# Patient Record
Sex: FEMALE | Race: WHITE | NOT HISPANIC OR LATINO | Employment: FULL TIME | URBAN - METROPOLITAN AREA
[De-identification: names, ages, dates, MRNs, and addresses within clinical notes are randomized per-mention and may not be internally consistent; named-entity substitution may affect disease eponyms.]

---

## 2018-02-07 ENCOUNTER — APPOINTMENT (OUTPATIENT)
Dept: WOMENS IMAGING | Facility: HOSPITAL | Age: 43
End: 2018-02-07

## 2018-02-07 PROCEDURE — 77067 SCR MAMMO BI INCL CAD: CPT | Performed by: RADIOLOGY

## 2018-03-05 ENCOUNTER — APPOINTMENT (OUTPATIENT)
Dept: PREADMISSION TESTING | Facility: HOSPITAL | Age: 43
End: 2018-03-05

## 2018-03-05 ENCOUNTER — APPOINTMENT (OUTPATIENT)
Dept: WOMENS IMAGING | Facility: HOSPITAL | Age: 43
End: 2018-03-05

## 2018-03-05 VITALS
HEART RATE: 89 BPM | BODY MASS INDEX: 39.34 KG/M2 | RESPIRATION RATE: 18 BRPM | TEMPERATURE: 99.4 F | SYSTOLIC BLOOD PRESSURE: 127 MMHG | DIASTOLIC BLOOD PRESSURE: 71 MMHG | OXYGEN SATURATION: 100 % | WEIGHT: 222 LBS | HEIGHT: 63 IN

## 2018-03-05 LAB
ANISOCYTOSIS BLD QL: NORMAL
BASOPHILS # BLD AUTO: 0.04 10*3/MM3 (ref 0–0.2)
BASOPHILS NFR BLD AUTO: 0.8 % (ref 0–1.5)
DEPRECATED RDW RBC AUTO: 53.9 FL (ref 37–54)
EOSINOPHIL # BLD AUTO: 0.05 10*3/MM3 (ref 0–0.7)
EOSINOPHIL NFR BLD AUTO: 1 % (ref 0.3–6.2)
ERYTHROCYTE [DISTWIDTH] IN BLOOD BY AUTOMATED COUNT: 20.7 % (ref 11.7–13)
HCG SERPL QL: NEGATIVE
HCT VFR BLD AUTO: 25.5 % (ref 35.6–45.5)
HGB BLD-MCNC: 7.1 G/DL (ref 11.9–15.5)
HYPOCHROMIA BLD QL: NORMAL
IMM GRANULOCYTES # BLD: 0 10*3/MM3 (ref 0–0.03)
IMM GRANULOCYTES NFR BLD: 0 % (ref 0–0.5)
LYMPHOCYTES # BLD AUTO: 1.69 10*3/MM3 (ref 0.9–4.8)
LYMPHOCYTES NFR BLD AUTO: 33.5 % (ref 19.6–45.3)
MCH RBC QN AUTO: 20.1 PG (ref 26.9–32)
MCHC RBC AUTO-ENTMCNC: 27.8 G/DL (ref 32.4–36.3)
MCV RBC AUTO: 72.2 FL (ref 80.5–98.2)
MICROCYTES BLD QL: NORMAL
MONOCYTES # BLD AUTO: 0.27 10*3/MM3 (ref 0.2–1.2)
MONOCYTES NFR BLD AUTO: 5.4 % (ref 5–12)
NEUTROPHILS # BLD AUTO: 2.99 10*3/MM3 (ref 1.9–8.1)
NEUTROPHILS NFR BLD AUTO: 59.3 % (ref 42.7–76)
NRBC BLD MANUAL-RTO: 0 /100 WBC (ref 0–0)
PLAT MORPH BLD: NORMAL
PLATELET # BLD AUTO: 306 10*3/MM3 (ref 140–500)
PMV BLD AUTO: 9.2 FL (ref 6–12)
RBC # BLD AUTO: 3.53 10*6/MM3 (ref 3.9–5.2)
WBC MORPH BLD: NORMAL
WBC NRBC COR # BLD: 5.04 10*3/MM3 (ref 4.5–10.7)

## 2018-03-05 PROCEDURE — 85007 BL SMEAR W/DIFF WBC COUNT: CPT | Performed by: OBSTETRICS & GYNECOLOGY

## 2018-03-05 PROCEDURE — 77061 BREAST TOMOSYNTHESIS UNI: CPT | Performed by: RADIOLOGY

## 2018-03-05 PROCEDURE — 93010 ELECTROCARDIOGRAM REPORT: CPT | Performed by: INTERNAL MEDICINE

## 2018-03-05 PROCEDURE — 77065 DX MAMMO INCL CAD UNI: CPT | Performed by: RADIOLOGY

## 2018-03-05 PROCEDURE — G0279 TOMOSYNTHESIS, MAMMO: HCPCS | Performed by: RADIOLOGY

## 2018-03-05 PROCEDURE — 85025 COMPLETE CBC W/AUTO DIFF WBC: CPT | Performed by: OBSTETRICS & GYNECOLOGY

## 2018-03-05 PROCEDURE — 36415 COLL VENOUS BLD VENIPUNCTURE: CPT

## 2018-03-05 PROCEDURE — 93005 ELECTROCARDIOGRAM TRACING: CPT

## 2018-03-05 PROCEDURE — 84703 CHORIONIC GONADOTROPIN ASSAY: CPT | Performed by: OBSTETRICS & GYNECOLOGY

## 2018-03-05 PROCEDURE — 76641 ULTRASOUND BREAST COMPLETE: CPT | Performed by: RADIOLOGY

## 2018-03-05 RX ORDER — FERROUS SULFATE 325(65) MG
325 TABLET ORAL
COMMUNITY
End: 2018-11-14

## 2018-03-05 NOTE — DISCHARGE INSTRUCTIONS
Take the following medications the morning of surgery with a small sip of water:  NONE  ARRIVE AT 8AM    General Instructions:  • Do not eat solid food after midnight the night before surgery.  • You may drink clear liquids day of surgery but must stop at least one hour before your hospital arrival time.  • It is beneficial for you to have a clear drink that contains carbohydrates the day of surgery.  We suggest a 12 to 20 ounce bottle of Gatorade or Powerade for non-diabetic patients or a 12 to 20 ounce bottle of G2 or Powerade Zero for diabetic patients. (Pediatric patients, are not advised to drink a 12 to 20 ounce carbohydrate drink)    Clear liquids are liquids you can see through.  Nothing red in color.     Plain water                               Sports drinks  Sodas                                   Gelatin (Jell-O)  Fruit juices without pulp such as white grape juice and apple juice  Popsicles that contain no fruit or yogurt  Tea or coffee (no cream or milk added)  Gatorade / Powerade  G2 / Powerade Zero    • Infants may have breast milk up to four hours before surgery.  • Infants drinking formula may drink formula up to six hours before surgery.   • Patients who avoid smoking, chewing tobacco and alcohol for 4 weeks prior to surgery have a reduced risk of post-operative complications.  Quit smoking as many days before surgery as you can.  • Do not smoke, use chewing tobacco or drink alcohol the day of surgery.   • If applicable bring your C-PAP/ BI-PAP machine.  • Bring any papers given to you in the doctor’s office.  • Wear clean comfortable clothes and socks.  • Do not wear contact lenses or make-up.  Bring a case for your glasses.   • Bring crutches or walker if applicable.  • Remove all piercings.  Leave jewelry and any other valuables at home.  • Hair extensions with metal clips must be removed prior to surgery.  • The Pre-Admission Testing nurse will instruct you to bring medications if unable to  obtain an accurate list in Pre-Admission Testing.      Preventing a Surgical Site Infection:  • For 2 to 3 days before surgery, avoid shaving with a razor because the razor can irritate skin and make it easier to develop an infection.  • The night prior to surgery sleep in a clean bed with clean clothing.  Do not allow pets to sleep with you.  • Shower on the morning of surgery using a fresh bar of anti-bacterial soap (such as Dial) and clean washcloth.  Dry with a clean towel and dress in clean clothing.  • Ask your surgeon if you will be receiving antibiotics prior to surgery.  • Make sure you, your family, and all healthcare providers clean their hands with soap and water or an alcohol based hand  before caring for you or your wound.    Day of surgery:  Upon arrival, a Pre-op nurse and Anesthesiologist will review your health history, obtain vital signs, and answer questions you may have.  The only belongings needed at this time will be your home medications and if applicable your C-PAP/BI-PAP machine.  If you are staying overnight your family can leave the rest of your belongings in the car and bring them to your room later.  A Pre-op nurse will start an IV and you may receive medication in preparation for surgery, including something to help you relax.  Your family will be able to see you in the Pre-op area.  While you are in surgery your family should notify the waiting room  if they leave the waiting room area and provide a contact phone number.    Please be aware that surgery does come with discomfort.  We want to make every effort to control your discomfort so please discuss any uncontrolled symptoms with your nurse.   Your doctor will most likely have prescribed pain medications.      If you are going home after surgery you will receive individualized written care instructions before being discharged.  A responsible adult must drive you to and from the hospital on the day of your  surgery and stay with you for 24 hours.    If you are staying overnight following surgery, you will be transported to your hospital room following the recovery period.  [facility] has all private rooms.    If you have any questions please call Pre-Admission Testing at [phone].  Deductibles and co-payments are collected on the day of service. Please be prepared to pay the required co-pay, deductible or deposit on the day of service as defined by your plan.

## 2018-03-12 ENCOUNTER — ANESTHESIA (OUTPATIENT)
Dept: PERIOP | Facility: HOSPITAL | Age: 43
End: 2018-03-12

## 2018-03-12 ENCOUNTER — ANESTHESIA EVENT (OUTPATIENT)
Dept: PERIOP | Facility: HOSPITAL | Age: 43
End: 2018-03-12

## 2018-03-12 ENCOUNTER — HOSPITAL ENCOUNTER (OUTPATIENT)
Facility: HOSPITAL | Age: 43
Setting detail: HOSPITAL OUTPATIENT SURGERY
Discharge: HOME | End: 2018-03-12
Attending: OBSTETRICS & GYNECOLOGY | Admitting: OBSTETRICS & GYNECOLOGY

## 2018-03-12 VITALS
HEART RATE: 86 BPM | HEIGHT: 64 IN | RESPIRATION RATE: 16 BRPM | WEIGHT: 226.63 LBS | SYSTOLIC BLOOD PRESSURE: 128 MMHG | DIASTOLIC BLOOD PRESSURE: 76 MMHG | TEMPERATURE: 98.4 F | BODY MASS INDEX: 38.69 KG/M2 | OXYGEN SATURATION: 97 %

## 2018-03-12 DIAGNOSIS — D64.9 ANEMIA: ICD-10-CM

## 2018-03-12 DIAGNOSIS — N94.6 MENORRHALGIA: ICD-10-CM

## 2018-03-12 LAB
B-HCG UR QL: NEGATIVE
INTERNAL NEGATIVE CONTROL: NEGATIVE
INTERNAL POSITIVE CONTROL: POSITIVE
Lab: NORMAL

## 2018-03-12 PROCEDURE — 88305 TISSUE EXAM BY PATHOLOGIST: CPT | Performed by: OBSTETRICS & GYNECOLOGY

## 2018-03-12 PROCEDURE — 25010000002 DEXAMETHASONE PER 1 MG: Performed by: NURSE ANESTHETIST, CERTIFIED REGISTERED

## 2018-03-12 PROCEDURE — 25010000002 PROPOFOL 10 MG/ML EMULSION: Performed by: NURSE ANESTHETIST, CERTIFIED REGISTERED

## 2018-03-12 PROCEDURE — 25010000002 FENTANYL CITRATE (PF) 100 MCG/2ML SOLUTION: Performed by: NURSE ANESTHETIST, CERTIFIED REGISTERED

## 2018-03-12 PROCEDURE — 25010000002 ROPIVACAINE PER 1 MG: Performed by: OBSTETRICS & GYNECOLOGY

## 2018-03-12 PROCEDURE — 25010000002 FENTANYL CITRATE (PF) 100 MCG/2ML SOLUTION

## 2018-03-12 PROCEDURE — 25010000002 DIPHENHYDRAMINE PER 50 MG: Performed by: NURSE ANESTHETIST, CERTIFIED REGISTERED

## 2018-03-12 PROCEDURE — 25010000002 MIDAZOLAM PER 1 MG: Performed by: ANESTHESIOLOGY

## 2018-03-12 RX ORDER — OXYCODONE AND ACETAMINOPHEN 7.5; 325 MG/1; MG/1
1 TABLET ORAL ONCE AS NEEDED
Status: COMPLETED | OUTPATIENT
Start: 2018-03-12 | End: 2018-03-12

## 2018-03-12 RX ORDER — FAMOTIDINE 10 MG/ML
20 INJECTION, SOLUTION INTRAVENOUS ONCE
Status: COMPLETED | OUTPATIENT
Start: 2018-03-12 | End: 2018-03-12

## 2018-03-12 RX ORDER — CELECOXIB 200 MG/1
400 CAPSULE ORAL ONCE
Status: DISCONTINUED | OUTPATIENT
Start: 2018-03-12 | End: 2018-03-12 | Stop reason: HOSPADM

## 2018-03-12 RX ORDER — HYDRALAZINE HYDROCHLORIDE 20 MG/ML
5 INJECTION INTRAMUSCULAR; INTRAVENOUS
Status: DISCONTINUED | OUTPATIENT
Start: 2018-03-12 | End: 2018-03-12 | Stop reason: HOSPADM

## 2018-03-12 RX ORDER — HYDROCODONE BITARTRATE AND ACETAMINOPHEN 7.5; 325 MG/1; MG/1
1 TABLET ORAL ONCE AS NEEDED
Status: DISCONTINUED | OUTPATIENT
Start: 2018-03-12 | End: 2018-03-12 | Stop reason: HOSPADM

## 2018-03-12 RX ORDER — DIPHENHYDRAMINE HYDROCHLORIDE 50 MG/ML
12.5 INJECTION INTRAMUSCULAR; INTRAVENOUS
Status: DISCONTINUED | OUTPATIENT
Start: 2018-03-12 | End: 2018-03-12 | Stop reason: HOSPADM

## 2018-03-12 RX ORDER — DEXAMETHASONE SODIUM PHOSPHATE 10 MG/ML
INJECTION INTRAMUSCULAR; INTRAVENOUS AS NEEDED
Status: DISCONTINUED | OUTPATIENT
Start: 2018-03-12 | End: 2018-03-12 | Stop reason: SURG

## 2018-03-12 RX ORDER — OXYCODONE AND ACETAMINOPHEN 7.5; 325 MG/1; MG/1
TABLET ORAL
Status: COMPLETED
Start: 2018-03-12 | End: 2018-03-12

## 2018-03-12 RX ORDER — CELECOXIB 200 MG/1
400 CAPSULE ORAL ONCE
Status: COMPLETED | OUTPATIENT
Start: 2018-03-12 | End: 2018-03-12

## 2018-03-12 RX ORDER — HYDROMORPHONE HCL 110MG/55ML
0.5 PATIENT CONTROLLED ANALGESIA SYRINGE INTRAVENOUS
Status: DISCONTINUED | OUTPATIENT
Start: 2018-03-12 | End: 2018-03-12 | Stop reason: HOSPADM

## 2018-03-12 RX ORDER — SODIUM CHLORIDE 0.9 % (FLUSH) 0.9 %
1-10 SYRINGE (ML) INJECTION AS NEEDED
Status: DISCONTINUED | OUTPATIENT
Start: 2018-03-12 | End: 2018-03-12 | Stop reason: HOSPADM

## 2018-03-12 RX ORDER — PROPOFOL 10 MG/ML
VIAL (ML) INTRAVENOUS AS NEEDED
Status: DISCONTINUED | OUTPATIENT
Start: 2018-03-12 | End: 2018-03-12 | Stop reason: SURG

## 2018-03-12 RX ORDER — FLUMAZENIL 0.1 MG/ML
0.2 INJECTION INTRAVENOUS AS NEEDED
Status: DISCONTINUED | OUTPATIENT
Start: 2018-03-12 | End: 2018-03-12 | Stop reason: HOSPADM

## 2018-03-12 RX ORDER — MEPERIDINE HYDROCHLORIDE 25 MG/ML
12.5 INJECTION INTRAMUSCULAR; INTRAVENOUS; SUBCUTANEOUS
Status: DISCONTINUED | OUTPATIENT
Start: 2018-03-12 | End: 2018-03-12 | Stop reason: HOSPADM

## 2018-03-12 RX ORDER — LIDOCAINE HYDROCHLORIDE 10 MG/ML
0.5 INJECTION, SOLUTION EPIDURAL; INFILTRATION; INTRACAUDAL; PERINEURAL ONCE AS NEEDED
Status: DISCONTINUED | OUTPATIENT
Start: 2018-03-12 | End: 2018-03-12 | Stop reason: HOSPADM

## 2018-03-12 RX ORDER — PROMETHAZINE HYDROCHLORIDE 25 MG/ML
12.5 INJECTION, SOLUTION INTRAMUSCULAR; INTRAVENOUS ONCE AS NEEDED
Status: DISCONTINUED | OUTPATIENT
Start: 2018-03-12 | End: 2018-03-12 | Stop reason: HOSPADM

## 2018-03-12 RX ORDER — FENTANYL CITRATE 50 UG/ML
INJECTION, SOLUTION INTRAMUSCULAR; INTRAVENOUS AS NEEDED
Status: DISCONTINUED | OUTPATIENT
Start: 2018-03-12 | End: 2018-03-12 | Stop reason: SURG

## 2018-03-12 RX ORDER — EPHEDRINE SULFATE 50 MG/ML
5 INJECTION, SOLUTION INTRAVENOUS ONCE AS NEEDED
Status: DISCONTINUED | OUTPATIENT
Start: 2018-03-12 | End: 2018-03-12 | Stop reason: HOSPADM

## 2018-03-12 RX ORDER — MIDAZOLAM HYDROCHLORIDE 1 MG/ML
1 INJECTION INTRAMUSCULAR; INTRAVENOUS
Status: DISCONTINUED | OUTPATIENT
Start: 2018-03-12 | End: 2018-03-12 | Stop reason: HOSPADM

## 2018-03-12 RX ORDER — NALOXONE HCL 0.4 MG/ML
0.2 VIAL (ML) INJECTION AS NEEDED
Status: DISCONTINUED | OUTPATIENT
Start: 2018-03-12 | End: 2018-03-12 | Stop reason: HOSPADM

## 2018-03-12 RX ORDER — PROMETHAZINE HYDROCHLORIDE 25 MG/1
25 TABLET ORAL ONCE AS NEEDED
Status: DISCONTINUED | OUTPATIENT
Start: 2018-03-12 | End: 2018-03-12 | Stop reason: HOSPADM

## 2018-03-12 RX ORDER — OXYCODONE HCL 10 MG/1
20 TABLET, FILM COATED, EXTENDED RELEASE ORAL ONCE
Status: DISCONTINUED | OUTPATIENT
Start: 2018-03-12 | End: 2018-03-12 | Stop reason: HOSPADM

## 2018-03-12 RX ORDER — SODIUM CHLORIDE, SODIUM LACTATE, POTASSIUM CHLORIDE, CALCIUM CHLORIDE 600; 310; 30; 20 MG/100ML; MG/100ML; MG/100ML; MG/100ML
INJECTION, SOLUTION INTRAVENOUS CONTINUOUS PRN
Status: DISCONTINUED | OUTPATIENT
Start: 2018-03-12 | End: 2018-03-12 | Stop reason: SURG

## 2018-03-12 RX ORDER — FENTANYL CITRATE 50 UG/ML
INJECTION, SOLUTION INTRAMUSCULAR; INTRAVENOUS
Status: COMPLETED
Start: 2018-03-12 | End: 2018-03-12

## 2018-03-12 RX ORDER — ONDANSETRON 2 MG/ML
4 INJECTION INTRAMUSCULAR; INTRAVENOUS ONCE AS NEEDED
Status: DISCONTINUED | OUTPATIENT
Start: 2018-03-12 | End: 2018-03-12 | Stop reason: HOSPADM

## 2018-03-12 RX ORDER — LIDOCAINE HYDROCHLORIDE 20 MG/ML
INJECTION, SOLUTION INFILTRATION; PERINEURAL AS NEEDED
Status: DISCONTINUED | OUTPATIENT
Start: 2018-03-12 | End: 2018-03-12 | Stop reason: SURG

## 2018-03-12 RX ORDER — PROMETHAZINE HYDROCHLORIDE 25 MG/1
12.5 TABLET ORAL ONCE AS NEEDED
Status: DISCONTINUED | OUTPATIENT
Start: 2018-03-12 | End: 2018-03-12 | Stop reason: HOSPADM

## 2018-03-12 RX ORDER — PROMETHAZINE HYDROCHLORIDE 25 MG/1
25 SUPPOSITORY RECTAL ONCE AS NEEDED
Status: DISCONTINUED | OUTPATIENT
Start: 2018-03-12 | End: 2018-03-12 | Stop reason: HOSPADM

## 2018-03-12 RX ORDER — SODIUM CHLORIDE, SODIUM LACTATE, POTASSIUM CHLORIDE, CALCIUM CHLORIDE 600; 310; 30; 20 MG/100ML; MG/100ML; MG/100ML; MG/100ML
9 INJECTION, SOLUTION INTRAVENOUS CONTINUOUS
Status: DISCONTINUED | OUTPATIENT
Start: 2018-03-12 | End: 2018-03-12 | Stop reason: HOSPADM

## 2018-03-12 RX ORDER — OXYCODONE HCL 10 MG/1
20 TABLET, FILM COATED, EXTENDED RELEASE ORAL ONCE
Status: COMPLETED | OUTPATIENT
Start: 2018-03-12 | End: 2018-03-12

## 2018-03-12 RX ORDER — FENTANYL CITRATE 50 UG/ML
50 INJECTION, SOLUTION INTRAMUSCULAR; INTRAVENOUS
Status: DISCONTINUED | OUTPATIENT
Start: 2018-03-12 | End: 2018-03-12 | Stop reason: HOSPADM

## 2018-03-12 RX ORDER — CEFAZOLIN SODIUM 2 G/100ML
2 INJECTION, SOLUTION INTRAVENOUS ONCE
Status: DISCONTINUED | OUTPATIENT
Start: 2018-03-12 | End: 2018-03-12 | Stop reason: HOSPADM

## 2018-03-12 RX ORDER — MIDAZOLAM HYDROCHLORIDE 1 MG/ML
2 INJECTION INTRAMUSCULAR; INTRAVENOUS
Status: DISCONTINUED | OUTPATIENT
Start: 2018-03-12 | End: 2018-03-12 | Stop reason: HOSPADM

## 2018-03-12 RX ORDER — LABETALOL HYDROCHLORIDE 5 MG/ML
5 INJECTION, SOLUTION INTRAVENOUS
Status: DISCONTINUED | OUTPATIENT
Start: 2018-03-12 | End: 2018-03-12 | Stop reason: HOSPADM

## 2018-03-12 RX ADMIN — DEXAMETHASONE SODIUM PHOSPHATE 6 MG: 10 INJECTION INTRAMUSCULAR; INTRAVENOUS at 10:26

## 2018-03-12 RX ADMIN — SODIUM CHLORIDE, POTASSIUM CHLORIDE, SODIUM LACTATE AND CALCIUM CHLORIDE 9 ML/HR: 600; 310; 30; 20 INJECTION, SOLUTION INTRAVENOUS at 10:00

## 2018-03-12 RX ADMIN — FENTANYL CITRATE 50 MCG: 50 INJECTION, SOLUTION INTRAMUSCULAR; INTRAVENOUS at 11:08

## 2018-03-12 RX ADMIN — FENTANYL CITRATE 50 MCG: 50 INJECTION, SOLUTION INTRAMUSCULAR; INTRAVENOUS at 11:36

## 2018-03-12 RX ADMIN — SODIUM CHLORIDE, POTASSIUM CHLORIDE, SODIUM LACTATE AND CALCIUM CHLORIDE: 600; 310; 30; 20 INJECTION, SOLUTION INTRAVENOUS at 09:31

## 2018-03-12 RX ADMIN — MIDAZOLAM 2 MG: 1 INJECTION INTRAMUSCULAR; INTRAVENOUS at 10:00

## 2018-03-12 RX ADMIN — FAMOTIDINE 20 MG: 10 INJECTION INTRAVENOUS at 10:00

## 2018-03-12 RX ADMIN — CELECOXIB 400 MG: 200 CAPSULE ORAL at 09:24

## 2018-03-12 RX ADMIN — OXYCODONE HYDROCHLORIDE 20 MG: 10 TABLET, FILM COATED, EXTENDED RELEASE ORAL at 09:24

## 2018-03-12 RX ADMIN — PROPOFOL 200 MG: 10 INJECTION, EMULSION INTRAVENOUS at 10:15

## 2018-03-12 RX ADMIN — DIPHENHYDRAMINE HYDROCHLORIDE 12.5 MG: 50 INJECTION INTRAMUSCULAR; INTRAVENOUS at 13:40

## 2018-03-12 RX ADMIN — FENTANYL CITRATE 50 MCG: 50 INJECTION INTRAMUSCULAR; INTRAVENOUS at 10:14

## 2018-03-12 RX ADMIN — LIDOCAINE HYDROCHLORIDE 100 MG: 20 INJECTION, SOLUTION INFILTRATION; PERINEURAL at 10:15

## 2018-03-12 RX ADMIN — OXYCODONE AND ACETAMINOPHEN 1 TABLET: 7.5; 325 TABLET ORAL at 12:48

## 2018-03-12 RX ADMIN — OXYCODONE HYDROCHLORIDE AND ACETAMINOPHEN 1 TABLET: 7.5; 325 TABLET ORAL at 12:48

## 2018-03-12 NOTE — ANESTHESIA POSTPROCEDURE EVALUATION
"Patient: Jaskaran Mckay    Procedure Summary     Date:  03/12/18 Room / Location:  [facility] OR 06 / [facility] MAIN OR    Anesthesia Start:  1008 Anesthesia Stop:  1057    Procedure:  DILATATION AND CURETTAGE HYSTEROSCOPY NOVASURE (N/A Vagina) Diagnosis:      Surgeon:  Mirtha Martin MD Provider:  Cristhian Hummel MD    Anesthesia Type:  general ASA Status:  3          Anesthesia Type: general  Last vitals  BP   147/88 (03/12/18 1250)   Temp   36.9 °C (98.4 °F) (03/12/18 1235)   Pulse   95 (03/12/18 1250)   Resp   16 (03/12/18 1250)     SpO2   100 % (03/12/18 1250)     Post Anesthesia Care and Evaluation    Patient location during evaluation: bedside  Patient participation: complete - patient participated  Level of consciousness: awake and alert  Pain management: adequate  Airway patency: patent  Anesthetic complications: No anesthetic complications    Cardiovascular status: acceptable  Respiratory status: acceptable  Hydration status: acceptable    Comments: /88   Pulse 95   Temp 36.9 °C (98.4 °F) (Oral)   Resp 16   Ht 162.6 cm (64.02\")   Wt 103 kg (226 lb 10.1 oz)   LMP 03/11/2018   SpO2 100%   BMI 38.88 kg/m²       "

## 2018-03-12 NOTE — OP NOTE
Subjective     Patient Name: Jaskaran Mckay  :  1975  MRN:  [id number]      Date of Service:  18      Surgeon: Mirtha Martin MD       Pre-operative diagnosis(es): Menorrhagia  Severe anemia   Post-operative diagnosis(es): Post-Op Diagnosis Codes:   same   Procedure(s): Procedure(s):  DILATATION AND CURETTAGE HYSTEROSCOPY NOVASURE           Anesthesia: Type: General - LMA       Objective      Operative findings: Blood in vagina/perineum as on menses. Slow trickle of dark blood. Mid normal uterus/ normal cervix ..... Hysteroscopy - normal menstrual endometrium, normal tubal ostia.... novasure- width 4.7cm, length 5.5cm. Power 142. Uninterrupted ablation time 1min 15 sec.  Normal post ablation endometrium     Specimens removed:   Order Name Source Comment Collection Info Order Time   TISSUE PATHOLOGY EXAM Endometrial Curettings  Collected By: Mirtha Martin MD 3/12/2018 10:45 AM          Complications:none    EBL: none   Hysteroscopic fluids- saline matched in=out      Details- DESCRIPTION OF PROCEDURE: She was taken to the operating room. She was placed under general LMA anesthesia. She was prepped and draped and her bladder was drained. Speculum was placed. A paracervical block was placed using a total of 30 mL of mixture of 1% lidocaine and 0.5% Naropin. Tenaculum was attached to the cervix. She sounded to 10 cm. The hysteroscope was advanced in endometrial cavity. She had a normal menstrual endometrium, as she was on her period. Measurements of the cervix were taken, as well, and the cavity length was calculated. A sharp curettage was performed with a #1 curette until a gritty texture was noted in all areas of the uterus. She had a relatively large amount of tissue, as she was just at the very beginning of her period. Another look with the hysteroscope revealed a normal post-curetting endometrium. NovaSure device was opened and was placed in the cavity and measurements were obtained and entered.  We passed the cavity assessment test without difficulty and there was an uninterrupted 1-minute 15-second ablation time that was completed. The NovaSure device was then removed from the uterus. Another look with the hysteroscope revealed a normal post-ablation endometrium. Instruments were removed. She was taken to recovery in good condition.                                                    Mirtha Martin MD  03/12/18  10:57 AM

## 2018-03-12 NOTE — ANESTHESIA PROCEDURE NOTES
Airway  Urgency: elective    Date/Time: 3/12/2018 10:17 AM  Airway not difficult    General Information and Staff    Patient location during procedure: OR  Anesthesiologist: JACKI HARMON  CRNA: AMBER GONZALEZ    Indications and Patient Condition  Indications for airway management: airway protection    Preoxygenated: yes  MILS not maintained throughout  Mask difficulty assessment: 1 - vent by mask    Final Airway Details  Final airway type: supraglottic airway      Successful airway: classic  Size 4    Number of attempts at approach: 1    Additional Comments  Pre O2, SIAI

## 2018-03-12 NOTE — ANESTHESIA PREPROCEDURE EVALUATION
Anesthesia Evaluation     Patient summary reviewed and Nursing notes reviewed   NPO Solid Status: > 8 hours  NPO Liquid Status: > 4 hours           Airway   Mallampati: II  TM distance: >3 FB  Neck ROM: full  no difficulty expected  Dental - normal exam     Pulmonary - negative pulmonary ROS and normal exam   Cardiovascular - negative cardio ROS and normal exam        Neuro/Psych- negative ROS  GI/Hepatic/Renal/Endo    (+) obesity, morbid obesity,      Musculoskeletal (-) negative ROS    Abdominal  - normal exam   Substance History - negative use     OB/GYN          Other - negative ROS                       Anesthesia Plan    ASA 3     general     intravenous induction   Anesthetic plan and risks discussed with patient.

## 2018-03-13 LAB
CYTO UR: NORMAL
LAB AP CASE REPORT: NORMAL
Lab: NORMAL
PATH REPORT.FINAL DX SPEC: NORMAL
PATH REPORT.GROSS SPEC: NORMAL

## 2018-04-27 ENCOUNTER — CONSULT (OUTPATIENT)
Dept: ONCOLOGY | Facility: CLINIC | Age: 43
End: 2018-04-27

## 2018-04-27 ENCOUNTER — LAB (OUTPATIENT)
Dept: LAB | Facility: HOSPITAL | Age: 43
End: 2018-04-27

## 2018-04-27 VITALS
TEMPERATURE: 97.9 F | DIASTOLIC BLOOD PRESSURE: 74 MMHG | BODY MASS INDEX: 38.1 KG/M2 | HEIGHT: 64 IN | SYSTOLIC BLOOD PRESSURE: 116 MMHG | WEIGHT: 223.2 LBS | RESPIRATION RATE: 12 BRPM | OXYGEN SATURATION: 99 % | HEART RATE: 87 BPM

## 2018-04-27 DIAGNOSIS — N92.0 MENORRHAGIA WITH REGULAR CYCLE: ICD-10-CM

## 2018-04-27 DIAGNOSIS — E55.9 VITAMIN D DEFICIENCY: ICD-10-CM

## 2018-04-27 DIAGNOSIS — D64.9 ANEMIA, UNSPECIFIED TYPE: Primary | ICD-10-CM

## 2018-04-27 DIAGNOSIS — D50.0 IRON DEFICIENCY ANEMIA DUE TO CHRONIC BLOOD LOSS: Primary | ICD-10-CM

## 2018-04-27 LAB
BASOPHILS # BLD AUTO: 0.04 10*3/MM3 (ref 0–0.1)
BASOPHILS NFR BLD AUTO: 0.9 % (ref 0–1.1)
DEPRECATED RDW RBC AUTO: 50.9 FL (ref 37–49)
EOSINOPHIL # BLD AUTO: 0.23 10*3/MM3 (ref 0–0.36)
EOSINOPHIL NFR BLD AUTO: 5.3 % (ref 1–5)
ERYTHROCYTE [DISTWIDTH] IN BLOOD BY AUTOMATED COUNT: 20 % (ref 11.7–14.5)
HCT VFR BLD AUTO: 25.4 % (ref 34–45)
HGB BLD-MCNC: 7.2 G/DL (ref 11.5–14.9)
IMM GRANULOCYTES # BLD: 0.02 10*3/MM3 (ref 0–0.03)
IMM GRANULOCYTES NFR BLD: 0.5 % (ref 0–0.5)
LYMPHOCYTES # BLD AUTO: 1.6 10*3/MM3 (ref 1–3.5)
LYMPHOCYTES NFR BLD AUTO: 37 % (ref 20–49)
MCH RBC QN AUTO: 19.9 PG (ref 27–33)
MCHC RBC AUTO-ENTMCNC: 28.3 G/DL (ref 32–35)
MCV RBC AUTO: 70.2 FL (ref 83–97)
MONOCYTES # BLD AUTO: 0.35 10*3/MM3 (ref 0.25–0.8)
MONOCYTES NFR BLD AUTO: 8.1 % (ref 4–12)
NEUTROPHILS # BLD AUTO: 2.09 10*3/MM3 (ref 1.5–7)
NEUTROPHILS NFR BLD AUTO: 48.2 % (ref 39–75)
NRBC BLD MANUAL-RTO: 0 /100 WBC (ref 0–0)
PLATELET # BLD AUTO: 374 10*3/MM3 (ref 150–375)
PMV BLD AUTO: 9.3 FL (ref 8.9–12.1)
RBC # BLD AUTO: 3.62 10*6/MM3 (ref 3.9–5)
WBC NRBC COR # BLD: 4.33 10*3/MM3 (ref 4–10)

## 2018-04-27 PROCEDURE — 99244 OFF/OP CNSLTJ NEW/EST MOD 40: CPT | Performed by: INTERNAL MEDICINE

## 2018-04-27 PROCEDURE — 85025 COMPLETE CBC W/AUTO DIFF WBC: CPT | Performed by: INTERNAL MEDICINE

## 2018-04-27 PROCEDURE — 36415 COLL VENOUS BLD VENIPUNCTURE: CPT | Performed by: INTERNAL MEDICINE

## 2018-04-27 RX ORDER — NAPROXEN 500 MG/1
TABLET ORAL
Refills: 0 | COMMUNITY
Start: 2018-03-19 | End: 2018-11-14

## 2018-04-27 NOTE — PROGRESS NOTES
REFERRING PROVIDER:    Mirtha Martin MD  [address] [facility] [address]  [place] [address]    REASON FOR CONSULTATION:    1.  Iron deficiency anemia secondary to menorrhagia    Old records reviewed.  Old lab data reviewed.      HISTORY OF PRESENT ILLNESS:  Jaskaran Mckay is a 42 y.o. female who is referred today for further evaluation of iron deficiency anemia secondary to menorrhagia.    She denies any significant bleeding problems prior to the past several years.  She had her wisdom teeth out in the past without excessive bleeding.  She denies any menorrhagia around the time of menarche.  Over the past several years, however, her menstrual periods have become extremely heavy.  The last for about 7 days.  At the beginning of her periods she is changing 2 pads every hour or so.  She had an endometrial ablation performed on 3/12/2018 and had a typical menstrual.  For her in early April.  They are waiting to see how her.  In May is before deciding on a hysterectomy but she has decided that she does want this done.  She has required packed red blood cells in the past.  She has been on oral iron, ferrous sulfate, twice daily for years.  She tolerates this well.  Her last transfusion was in September 2017.   Iron studies done at [facility] in September 2017 showed an iron of 37% iron saturation and a ferritin of 5.8.  Recent labs on 3/5/2018 showed a hemoglobin of 7.1 with hematocrit 25.5% and MCV of 72.2.  She has never had intravenous iron.  She has not tried tranexamic acid for the menorrhagia.    She craves ice.  She reports chronic weakness and dyspnea on exertion.  She denies any other bleeding.  She had an upper and lower endoscopy performed last year that did not indicate any bleeding source.    Past Medical History:   Diagnosis Date   • Abnormal Pap smear of cervix    • Anemia    • Fibroid uterus    • H/O EJ (iron deficiency anemia)    • History of blood transfusion    • History of snoring    • Lupus        Past  Surgical History:   Procedure Laterality Date   • ACHILLES TENDON REPAIR Right    • D&C HYSTEROSCOPY ENDOMETRIAL ABLATION N/A 3/12/2018    Procedure: DILATATION AND CURETTAGE HYSTEROSCOPY NOVASURE;  Surgeon: Mirtha Martin MD;  Location: [facility];  Service: Obstetrics/Gynecology   • EYE SURGERY Right        SOCIAL HISTORY:   reports that she has never smoked. She has never used smokeless tobacco. She reports that she does not drink alcohol or use drugs.  Denies family history of bleeding disorders other than menorrhagia.    FAMILY HISTORY:  family history includes Breast cancer in her maternal aunt; Cancer in her maternal grandmother; Mental illness in her maternal aunt and paternal aunt.    ALLERGIES:  Allergies   Allergen Reactions   • Penicillins Rash     Childhood allergy, has taken cefazolin in ppast       MEDICATIONS:  The medication list has been reviewed with the patient by the medical assistant, and the list has been updated in the electronic medical record, which I reviewed.  Medication dosages and frequencies were confirmed to be accurate.    Review of Systems   Constitutional: Positive for fatigue. Negative for appetite change, chills, fever and unexpected weight change.   HENT: Negative for congestion, dental problem, facial swelling, hearing loss, mouth sores, nosebleeds, rhinorrhea, sore throat, tinnitus, trouble swallowing and voice change.    Eyes: Negative.    Respiratory: Positive for shortness of breath. Negative for cough, chest tightness, wheezing and stridor.    Cardiovascular: Negative for chest pain, palpitations and leg swelling.   Gastrointestinal: Negative for abdominal distention, abdominal pain, blood in stool, constipation, diarrhea, nausea and vomiting.   Endocrine: Negative.    Genitourinary: Positive for menstrual problem and vaginal bleeding. Negative for difficulty urinating, dysuria, flank pain, frequency, hematuria, pelvic pain and vaginal discharge.   Musculoskeletal:  "Negative for arthralgias, back pain, joint swelling, myalgias, neck pain and neck stiffness.   Skin: Negative for color change, rash and wound.   Allergic/Immunologic: Negative for environmental allergies.   Neurological: Negative for dizziness, seizures, syncope, weakness, numbness and headaches.   Hematological: Negative for adenopathy. Does not bruise/bleed easily.   Psychiatric/Behavioral: Negative for agitation, confusion and sleep disturbance. The patient is not nervous/anxious.    All other systems reviewed and are negative.      Vitals:    04/27/18 1116   BP: 116/74   Pulse: 87   Resp: 12   Temp: 97.9 °F (36.6 °C)   TempSrc: Oral   SpO2: 99%   Weight: 101 kg (223 lb 3.2 oz)   Height: 162 cm (63.78\")  Comment: new height   PainSc: 0-No pain       Physical Exam   Constitutional: She is oriented to person, place, and time. She appears well-developed and well-nourished.   HENT:   Head: Normocephalic and atraumatic.   Nose: Nose normal.   Eyes: Conjunctivae and EOM are normal. Pupils are equal, round, and reactive to light.   Neck: Neck supple.   Cardiovascular: Normal rate, regular rhythm, S1 normal, S2 normal and normal heart sounds.  Exam reveals no gallop and no friction rub.    No murmur heard.  Pulmonary/Chest: Effort normal and breath sounds normal. No stridor. No respiratory distress. She has no wheezes. She has no rhonchi. She has no rales. She exhibits no tenderness.   Abdominal: Soft. Bowel sounds are normal. She exhibits no distension and no mass. There is no tenderness. There is no rebound and no guarding.   Musculoskeletal: Normal range of motion. She exhibits no edema.   Lymphadenopathy:     She has no cervical adenopathy.     She has no axillary adenopathy.        Right: No inguinal and no supraclavicular adenopathy present.        Left: No inguinal and no supraclavicular adenopathy present.   Neurological: She is alert and oriented to person, place, and time. No cranial nerve deficit or sensory " deficit.   Skin: Skin is warm and dry. No rash noted. No erythema.   Psychiatric: She has a normal mood and affect. Her behavior is normal. Judgment and thought content normal.   Vitals reviewed.      DIAGNOSTIC DATA:  Results for orders placed or performed in visit on 04/27/18   CBC Auto Differential   Result Value Ref Range    WBC 4.33 4.00 - 10.00 10*3/mm3    RBC 3.62 (L) 3.90 - 5.00 10*6/mm3    Hemoglobin 7.2 (C) 11.5 - 14.9 g/dL    Hematocrit 25.4 (L) 34.0 - 45.0 %    MCV 70.2 (L) 83.0 - 97.0 fL    MCH 19.9 (L) 27.0 - 33.0 pg    MCHC 28.3 (L) 32.0 - 35.0 g/dL    RDW 20.0 (H) 11.7 - 14.5 %    RDW-SD 50.9 (H) 37.0 - 49.0 fl    MPV 9.3 8.9 - 12.1 fL    Platelets 374 150 - 375 10*3/mm3    Neutrophil % 48.2 39.0 - 75.0 %    Lymphocyte % 37.0 20.0 - 49.0 %    Monocyte % 8.1 4.0 - 12.0 %    Eosinophil % 5.3 (H) 1.0 - 5.0 %    Basophil % 0.9 0.0 - 1.1 %    Immature Grans % 0.5 0.0 - 0.5 %    Neutrophils, Absolute 2.09 1.50 - 7.00 10*3/mm3    Lymphocytes, Absolute 1.60 1.00 - 3.50 10*3/mm3    Monocytes, Absolute 0.35 0.25 - 0.80 10*3/mm3    Eosinophils, Absolute 0.23 0.00 - 0.36 10*3/mm3    Basophils, Absolute 0.04 0.00 - 0.10 10*3/mm3    Immature Grans, Absolute 0.02 0.00 - 0.03 10*3/mm3    nRBC 0.0 0.0 - 0.0 /100 WBC       IMAGING:    I personally reviewed her peripheral blood smear.  Hypochromic, microcytic red cells noted.  Adequate platelets.  I did see one giant platelet.  White blood cells adequate in number and normal in maturation and distribution.    ASSESSMENT:  This is a 42 y.o. female with:  1.  Iron deficiency anemia secondary to menorrhagia: She does not desire red blood cell transfusion at this time.  She is on ferrous sulfate twice daily which she tolerates well but she clearly is not responding well to this secondary to the profound menstrual blood loss.  We discussed intravenous iron today.  We will proceed with 2 doses of intravenous Feraheme with premedications to avoid an infusion reaction.  We did  discuss the possibility of an anaphylactic reaction today.  She is in favor of proceeding with intravenous iron.  2.  Menorrhagia: This has been a recent issue for her over the past couple of years.  Her history is not consistent with a bleeding disorder.  She has had her wisdom teeth removed without any excessive bleeding.  Menorrhagia did not happen at the time of menarche.  She did have an endometrial ablation performed on 3/12/2018 and there is still some hope that the menstrual bleeding will decrease following this.  If not, hysterectomy as planned.  We also discussed today that tranexamic acid is approved for use in this situation.  She is not interested in pursuing this at this time but it is an option for her to take during her periods if she wishes to avoid hysterectomy and the ablation is not as successful as desired.    PLAN:   1.  Proceed with 2 doses of intravenous Feraheme  2.  I will see her back about 4 weeks after she has had her last infusion with labs.  3.  Tranexamic acid can be considered to try to decrease menstrual blood loss if desired.

## 2018-04-30 ENCOUNTER — TELEPHONE (OUTPATIENT)
Dept: GENERAL RADIOLOGY | Facility: HOSPITAL | Age: 43
End: 2018-04-30

## 2018-04-30 DIAGNOSIS — D50.0 IRON DEFICIENCY ANEMIA DUE TO CHRONIC BLOOD LOSS: Primary | ICD-10-CM

## 2018-04-30 PROBLEM — K90.49 MALABSORPTION DUE TO INTOLERANCE, NOT ELSEWHERE CLASSIFIED: Status: ACTIVE | Noted: 2018-04-30

## 2018-04-30 NOTE — TELEPHONE ENCOUNTER
----- Message from Nella Sawyer RN sent at 4/30/2018 11:21 AM EDT -----  Patient needs a lab appointment on 5/1/18 before receiving Feraheme due to needing a STAT Ferritin and Iron Panel for Insurance approval. Thanks

## 2018-05-01 ENCOUNTER — INFUSION (OUTPATIENT)
Dept: ONCOLOGY | Facility: HOSPITAL | Age: 43
End: 2018-05-01

## 2018-05-01 ENCOUNTER — LAB (OUTPATIENT)
Dept: LAB | Facility: HOSPITAL | Age: 43
End: 2018-05-01

## 2018-05-01 ENCOUNTER — APPOINTMENT (OUTPATIENT)
Dept: ONCOLOGY | Facility: HOSPITAL | Age: 43
End: 2018-05-01

## 2018-05-01 VITALS
WEIGHT: 224.2 LBS | HEART RATE: 86 BPM | SYSTOLIC BLOOD PRESSURE: 135 MMHG | DIASTOLIC BLOOD PRESSURE: 81 MMHG | BODY MASS INDEX: 38.75 KG/M2

## 2018-05-01 DIAGNOSIS — D50.0 IRON DEFICIENCY ANEMIA DUE TO CHRONIC BLOOD LOSS: ICD-10-CM

## 2018-05-01 DIAGNOSIS — D50.8 OTHER IRON DEFICIENCY ANEMIA: Primary | ICD-10-CM

## 2018-05-01 DIAGNOSIS — K90.49 MALABSORPTION DUE TO INTOLERANCE, NOT ELSEWHERE CLASSIFIED: Primary | ICD-10-CM

## 2018-05-01 LAB
BASOPHILS # BLD AUTO: 0.03 10*3/MM3 (ref 0–0.1)
BASOPHILS NFR BLD AUTO: 0.6 % (ref 0–1.1)
DEPRECATED RDW RBC AUTO: 51.3 FL (ref 37–49)
EOSINOPHIL # BLD AUTO: 0.11 10*3/MM3 (ref 0–0.36)
EOSINOPHIL NFR BLD AUTO: 2.3 % (ref 1–5)
ERYTHROCYTE [DISTWIDTH] IN BLOOD BY AUTOMATED COUNT: 19.9 % (ref 11.7–14.5)
FERRITIN SERPL-MCNC: 5.3 NG/ML (ref 11–207)
HCT VFR BLD AUTO: 26.4 % (ref 34–45)
HGB BLD-MCNC: 7.6 G/DL (ref 11.5–14.9)
IMM GRANULOCYTES # BLD: 0.01 10*3/MM3 (ref 0–0.03)
IMM GRANULOCYTES NFR BLD: 0.2 % (ref 0–0.5)
IRON 24H UR-MRATE: 15 MCG/DL (ref 37–145)
IRON SATN MFR SERPL: 4 % (ref 14–48)
LYMPHOCYTES # BLD AUTO: 1.75 10*3/MM3 (ref 1–3.5)
LYMPHOCYTES NFR BLD AUTO: 35.9 % (ref 20–49)
MCH RBC QN AUTO: 20.4 PG (ref 27–33)
MCHC RBC AUTO-ENTMCNC: 28.8 G/DL (ref 32–35)
MCV RBC AUTO: 70.8 FL (ref 83–97)
MONOCYTES # BLD AUTO: 0.27 10*3/MM3 (ref 0.25–0.8)
MONOCYTES NFR BLD AUTO: 5.5 % (ref 4–12)
NEUTROPHILS # BLD AUTO: 2.7 10*3/MM3 (ref 1.5–7)
NEUTROPHILS NFR BLD AUTO: 55.5 % (ref 39–75)
NRBC BLD MANUAL-RTO: 0 /100 WBC (ref 0–0)
PLATELET # BLD AUTO: 392 10*3/MM3 (ref 150–375)
PMV BLD AUTO: 9.6 FL (ref 8.9–12.1)
RBC # BLD AUTO: 3.73 10*6/MM3 (ref 3.9–5)
TIBC SERPL-MCNC: 414 MCG/DL (ref 249–505)
TRANSFERRIN SERPL-MCNC: 296 MG/DL (ref 200–360)
WBC NRBC COR # BLD: 4.87 10*3/MM3 (ref 4–10)

## 2018-05-01 PROCEDURE — 63710000001 DIPHENHYDRAMINE PER 50 MG: Performed by: INTERNAL MEDICINE

## 2018-05-01 PROCEDURE — 84466 ASSAY OF TRANSFERRIN: CPT

## 2018-05-01 PROCEDURE — 85025 COMPLETE CBC W/AUTO DIFF WBC: CPT

## 2018-05-01 PROCEDURE — 83540 ASSAY OF IRON: CPT

## 2018-05-01 PROCEDURE — 82728 ASSAY OF FERRITIN: CPT

## 2018-05-01 PROCEDURE — 96374 THER/PROPH/DIAG INJ IV PUSH: CPT

## 2018-05-01 PROCEDURE — 25010000002 FERUMOXYTOL 510 MG/17ML SOLUTION 510 MG VIAL: Performed by: INTERNAL MEDICINE

## 2018-05-01 PROCEDURE — 36415 COLL VENOUS BLD VENIPUNCTURE: CPT | Performed by: INTERNAL MEDICINE

## 2018-05-01 RX ORDER — FAMOTIDINE 10 MG/ML
20 INJECTION, SOLUTION INTRAVENOUS ONCE
Status: COMPLETED | OUTPATIENT
Start: 2018-05-01 | End: 2018-05-01

## 2018-05-01 RX ORDER — SODIUM CHLORIDE 9 MG/ML
250 INJECTION, SOLUTION INTRAVENOUS ONCE
Status: COMPLETED | OUTPATIENT
Start: 2018-05-01 | End: 2018-05-01

## 2018-05-01 RX ORDER — DIPHENHYDRAMINE HCL 25 MG
25 CAPSULE ORAL ONCE
Status: COMPLETED | OUTPATIENT
Start: 2018-05-01 | End: 2018-05-01

## 2018-05-01 RX ADMIN — SODIUM CHLORIDE 250 ML: 9 INJECTION, SOLUTION INTRAVENOUS at 14:15

## 2018-05-01 RX ADMIN — DIPHENHYDRAMINE HYDROCHLORIDE 25 MG: 25 CAPSULE ORAL at 14:06

## 2018-05-01 RX ADMIN — FERUMOXYTOL 510 MG: 510 INJECTION INTRAVENOUS at 14:45

## 2018-05-01 RX ADMIN — FAMOTIDINE 20 MG: 10 INJECTION, SOLUTION INTRAVENOUS at 14:15

## 2018-05-01 NOTE — PROGRESS NOTES
CBC resulted, noted hgb 7.6.  Pt denies SOA or fatigue.  Per Dr York's office note, pt not wishing to proceed with blood transfusion currently.  Educated pt on s/s of when to notify office or proceed to ED, she v/u. Labs printed and provided

## 2018-05-07 RX ORDER — DIPHENHYDRAMINE HCL 25 MG
25 CAPSULE ORAL ONCE
Status: CANCELLED | OUTPATIENT
Start: 2018-05-09

## 2018-05-07 RX ORDER — SODIUM CHLORIDE 9 MG/ML
250 INJECTION, SOLUTION INTRAVENOUS ONCE
Status: CANCELLED | OUTPATIENT
Start: 2018-05-09

## 2018-05-09 ENCOUNTER — INFUSION (OUTPATIENT)
Dept: ONCOLOGY | Facility: HOSPITAL | Age: 43
End: 2018-05-09

## 2018-05-09 VITALS
TEMPERATURE: 98.5 F | HEART RATE: 102 BPM | SYSTOLIC BLOOD PRESSURE: 144 MMHG | WEIGHT: 221 LBS | RESPIRATION RATE: 18 BRPM | BODY MASS INDEX: 38.2 KG/M2 | DIASTOLIC BLOOD PRESSURE: 89 MMHG | OXYGEN SATURATION: 97 %

## 2018-05-09 DIAGNOSIS — D50.0 IRON DEFICIENCY ANEMIA DUE TO CHRONIC BLOOD LOSS: ICD-10-CM

## 2018-05-09 DIAGNOSIS — K90.49 MALABSORPTION DUE TO INTOLERANCE, NOT ELSEWHERE CLASSIFIED: Primary | ICD-10-CM

## 2018-05-09 PROCEDURE — 25010000002 FERUMOXYTOL 510 MG/17ML SOLUTION 510 MG VIAL: Performed by: INTERNAL MEDICINE

## 2018-05-09 PROCEDURE — 63710000001 DIPHENHYDRAMINE PER 50 MG: Performed by: INTERNAL MEDICINE

## 2018-05-09 PROCEDURE — 96374 THER/PROPH/DIAG INJ IV PUSH: CPT

## 2018-05-09 RX ORDER — DIPHENHYDRAMINE HCL 25 MG
25 CAPSULE ORAL ONCE
Status: COMPLETED | OUTPATIENT
Start: 2018-05-09 | End: 2018-05-09

## 2018-05-09 RX ORDER — SODIUM CHLORIDE 9 MG/ML
250 INJECTION, SOLUTION INTRAVENOUS ONCE
Status: COMPLETED | OUTPATIENT
Start: 2018-05-09 | End: 2018-05-09

## 2018-05-09 RX ADMIN — DIPHENHYDRAMINE HYDROCHLORIDE 25 MG: 25 CAPSULE ORAL at 13:08

## 2018-05-09 RX ADMIN — FERUMOXYTOL 510 MG: 510 INJECTION INTRAVENOUS at 13:29

## 2018-05-09 RX ADMIN — SODIUM CHLORIDE 250 ML: 9 INJECTION, SOLUTION INTRAVENOUS at 13:29

## 2018-06-05 DIAGNOSIS — D50.0 IRON DEFICIENCY ANEMIA DUE TO CHRONIC BLOOD LOSS: Primary | ICD-10-CM

## 2018-06-08 ENCOUNTER — OFFICE VISIT (OUTPATIENT)
Dept: ONCOLOGY | Facility: CLINIC | Age: 43
End: 2018-06-08

## 2018-06-08 ENCOUNTER — LAB (OUTPATIENT)
Dept: LAB | Facility: HOSPITAL | Age: 43
End: 2018-06-08

## 2018-06-08 ENCOUNTER — APPOINTMENT (OUTPATIENT)
Dept: LAB | Facility: HOSPITAL | Age: 43
End: 2018-06-08

## 2018-06-08 ENCOUNTER — APPOINTMENT (OUTPATIENT)
Dept: ONCOLOGY | Facility: CLINIC | Age: 43
End: 2018-06-08

## 2018-06-08 VITALS
HEART RATE: 89 BPM | WEIGHT: 226.4 LBS | SYSTOLIC BLOOD PRESSURE: 134 MMHG | BODY MASS INDEX: 38.65 KG/M2 | TEMPERATURE: 98.8 F | OXYGEN SATURATION: 96 % | HEIGHT: 64 IN | RESPIRATION RATE: 16 BRPM | DIASTOLIC BLOOD PRESSURE: 84 MMHG

## 2018-06-08 DIAGNOSIS — D50.0 IRON DEFICIENCY ANEMIA DUE TO CHRONIC BLOOD LOSS: Primary | ICD-10-CM

## 2018-06-08 DIAGNOSIS — D50.0 IRON DEFICIENCY ANEMIA DUE TO CHRONIC BLOOD LOSS: ICD-10-CM

## 2018-06-08 LAB
BASOPHILS # BLD AUTO: 0.06 10*3/MM3 (ref 0–0.1)
BASOPHILS NFR BLD AUTO: 0.9 % (ref 0–1.1)
EOSINOPHIL # BLD AUTO: 0.18 10*3/MM3 (ref 0–0.36)
EOSINOPHIL NFR BLD AUTO: 2.7 % (ref 1–5)
ERYTHROCYTE [DISTWIDTH] IN BLOOD BY AUTOMATED COUNT: ABNORMAL % (ref 11.7–14.5)
FERRITIN SERPL-MCNC: 99.3 NG/ML (ref 11–207)
HCT VFR BLD AUTO: 37.5 % (ref 34–45)
HGB BLD-MCNC: 11.8 G/DL (ref 11.5–14.9)
IMM GRANULOCYTES # BLD: 0.02 10*3/MM3 (ref 0–0.03)
IMM GRANULOCYTES NFR BLD: 0.3 % (ref 0–0.5)
IRON 24H UR-MRATE: 59 MCG/DL (ref 37–145)
IRON SATN MFR SERPL: 21 % (ref 14–48)
LYMPHOCYTES # BLD AUTO: 1.8 10*3/MM3 (ref 1–3.5)
LYMPHOCYTES NFR BLD AUTO: 27.2 % (ref 20–49)
MCH RBC QN AUTO: 26.8 PG (ref 27–33)
MCHC RBC AUTO-ENTMCNC: 31.5 G/DL (ref 32–35)
MCV RBC AUTO: 85.2 FL (ref 83–97)
MONOCYTES # BLD AUTO: 0.48 10*3/MM3 (ref 0.25–0.8)
MONOCYTES NFR BLD AUTO: 7.3 % (ref 4–12)
NEUTROPHILS # BLD AUTO: 4.08 10*3/MM3 (ref 1.5–7)
NEUTROPHILS NFR BLD AUTO: 61.6 % (ref 39–75)
NRBC BLD MANUAL-RTO: 0 /100 WBC (ref 0–0)
PLATELET # BLD AUTO: 294 10*3/MM3 (ref 150–375)
PMV BLD AUTO: 9.7 FL (ref 8.9–12.1)
RBC # BLD AUTO: 4.4 10*6/MM3 (ref 3.9–5)
TIBC SERPL-MCNC: 277 MCG/DL (ref 249–505)
TRANSFERRIN SERPL-MCNC: 198 MG/DL (ref 200–360)
WBC NRBC COR # BLD: 6.62 10*3/MM3 (ref 4–10)

## 2018-06-08 PROCEDURE — 84466 ASSAY OF TRANSFERRIN: CPT | Performed by: INTERNAL MEDICINE

## 2018-06-08 PROCEDURE — 85025 COMPLETE CBC W/AUTO DIFF WBC: CPT | Performed by: INTERNAL MEDICINE

## 2018-06-08 PROCEDURE — 83540 ASSAY OF IRON: CPT | Performed by: INTERNAL MEDICINE

## 2018-06-08 PROCEDURE — 82728 ASSAY OF FERRITIN: CPT | Performed by: INTERNAL MEDICINE

## 2018-06-08 PROCEDURE — 36415 COLL VENOUS BLD VENIPUNCTURE: CPT | Performed by: INTERNAL MEDICINE

## 2018-06-08 PROCEDURE — 99213 OFFICE O/P EST LOW 20 MIN: CPT | Performed by: INTERNAL MEDICINE

## 2018-06-08 NOTE — PROGRESS NOTES
[facility] GROUP OUTPATIENT FOLLOW UP CLINIC VISIT    REASON FOR FOLLOW-UP:    1.  Iron deficiency anemia secondary to menorrhagia  2.  Intravenous Feraheme administered on 5/1/2018 and 5/9/2018.  Iron levels did not respond to twice daily ferrous sulfate.    HISTORY OF PRESENT ILLNESS:  Jaskaran Mckay is a 42 y.o. female who returns today for follow up of the above issue.      She is feeling much better.  The ice craving has resolved.  Energy level has improved significantly.  She unfortunately continues to have menorrhagia.        HEMATOLOGIC HISTORY:  She denies any significant bleeding problems prior to the past several years.  She had her wisdom teeth out in the past without excessive bleeding.  She denies any menorrhagia around the time of menarche.  Over the past several years, however, her menstrual periods have become extremely heavy.  The last for about 7 days.  At the beginning of her periods she is changing 2 pads every hour or so.  She had an endometrial ablation performed on 3/12/2018 and had a typical menstrual.  For her in early April.  They are waiting to see how her.  In May is before deciding on a hysterectomy but she has decided that she does want this done.  She has required packed red blood cells in the past.  She has been on oral iron, ferrous sulfate, twice daily for years.  She tolerates this well.  Her last transfusion was in September 2017.   Iron studies done at [place] in September 2017 showed an iron of 37% iron saturation and a ferritin of 5.8.  Recent labs on 3/5/2018 showed a hemoglobin of 7.1 with hematocrit 25.5% and MCV of 72.2.  She has never had intravenous iron.  She has not tried tranexamic acid for the menorrhagia.    ALLERGIES:  Allergies   Allergen Reactions   • Penicillins Rash     Childhood allergy, has taken cefazolin in ppast       MEDICATIONS:  The medication list has been reviewed with the patient by the medical assistant, and the list has been updated in the  "electronic medical record, which I reviewed.  Medication dosages and frequencies were confirmed to be accurate.    REVIEW OF SYSTEMS:  PAIN:  See Vital Signs below.  GENERAL:  No fevers, chills, night sweats, or unintended weight loss.  SKIN:  No rashes or non-healing lesions.  HEME/LYMPH:  No abnormal bleeding aside from menorrhagia.  No palpable lymphadenopathy.  EYES:  No vision changes or diplopia.  ENT:  No sore throat or difficulty swallowing.  RESPIRATORY:  No cough, shortness of breath, hemoptysis, or wheezing.  CARDIOVASCULAR:  No chest pain, palpitations, orthopnea, or dyspnea on exertion.  GASTROINTESTINAL:  No abdominal pain, nausea, vomiting, constipation, diarrhea, melena, or hematochezia.  GENITOURINARY:  No dysuria or hematuria.  Menorrhagia  MUSCULOSKELETAL:  No joint pain, swelling, or erythema.  NEUROLOGIC:  No dizziness, loss of consciousness, or seizures.  PSYCHIATRIC:  No depression, anxiety, or mood changes.    Vitals:    06/08/18 1521   BP: 134/84   Pulse: 89   Resp: 16   Temp: 98.8 °F (37.1 °C)   TempSrc: Oral   SpO2: 96%   Weight: 103 kg (226 lb 6.4 oz)   Height: 162 cm (63.78\")   PainSc: 0-No pain       PHYSICAL EXAMINATION:  GENERAL:  Well-developed well-nourished female; awake, alert and oriented, in no acute distress.  SKIN:  Warm and dry, without rashes, purpura, or petechiae.  HEAD:  Normocephalic, atraumatic.  EYES:  Pupils equal, round and reactive to light.  Extraocular movements intact.  Conjunctivae normal.  EARS:  Hearing intact.  NOSE:  Septum midline.  No excoriations or nasal discharge.  MOUTH:  No stomatitis or ulcers.  Lips are normal.  THROAT:  Oropharynx without lesions or exudates.  LYMPHATICS:  No cervical, supraclavicular, or axillary lymphadenopathy.  CHEST:  Lungs are clear to auscultation bilaterally.  No wheezes, rales, or rhonchi.  HEART:  Regular rate; normal rhythm.  No murmurs, gallops or rubs.  EXTREMITIES:  No  edema.  NEUROLOGICAL:  No focal neurologic " deficits.    DIAGNOSTIC DATA:  Results for orders placed or performed in visit on 06/08/18   CBC Auto Differential   Result Value Ref Range    WBC 6.62 4.00 - 10.00 10*3/mm3    RBC 4.40 3.90 - 5.00 10*6/mm3    Hemoglobin 11.8 11.5 - 14.9 g/dL    Hematocrit 37.5 34.0 - 45.0 %    MCV 85.2 83.0 - 97.0 fL    MCH 26.8 (L) 27.0 - 33.0 pg    MCHC 31.5 (L) 32.0 - 35.0 g/dL    RDW  11.7 - 14.5 %    MPV 9.7 8.9 - 12.1 fL    Platelets 294 150 - 375 10*3/mm3    Neutrophil % 61.6 39.0 - 75.0 %    Lymphocyte % 27.2 20.0 - 49.0 %    Monocyte % 7.3 4.0 - 12.0 %    Eosinophil % 2.7 1.0 - 5.0 %    Basophil % 0.9 0.0 - 1.1 %    Immature Grans % 0.3 0.0 - 0.5 %    Neutrophils, Absolute 4.08 1.50 - 7.00 10*3/mm3    Lymphocytes, Absolute 1.80 1.00 - 3.50 10*3/mm3    Monocytes, Absolute 0.48 0.25 - 0.80 10*3/mm3    Eosinophils, Absolute 0.18 0.00 - 0.36 10*3/mm3    Basophils, Absolute 0.06 0.00 - 0.10 10*3/mm3    Immature Grans, Absolute 0.02 0.00 - 0.03 10*3/mm3    nRBC 0.0 0.0 - 0.0 /100 WBC       IMAGING:  None reviewed    ASSESSMENT:  This is a 42 y.o. female with:  1.  Iron deficiency anemia secondary to menorrhagia: Her iron levels did not respond to ferrous sulfate twice daily.  She is no longer taking this.  Her hemoglobin has normalized after 2 doses of intravenous Feraheme and she is feeling much better with resolution of her ice craving.  We will continue to monitor her for recurrent iron deficiency.  As long as she is having significant metromenorrhagia she is at risk for further iron deficiency.  2.  Metromenorrhagia: No other history of easy bleeding.  She did have an endometrial ablation performed on 3/12/2018 but the issue persists.  She will follow-up with her gynecologist regarding this.    PLAN:  1.  I will see her back in about 3-4 months with labs including CBC, reticulocyte count, ferritin, and iron profile

## 2018-09-05 ENCOUNTER — OFFICE VISIT (OUTPATIENT)
Dept: ONCOLOGY | Facility: CLINIC | Age: 43
End: 2018-09-05

## 2018-09-05 ENCOUNTER — LAB (OUTPATIENT)
Dept: LAB | Facility: HOSPITAL | Age: 43
End: 2018-09-05

## 2018-09-05 VITALS
TEMPERATURE: 97.9 F | BODY MASS INDEX: 39.64 KG/M2 | WEIGHT: 232.2 LBS | OXYGEN SATURATION: 100 % | SYSTOLIC BLOOD PRESSURE: 130 MMHG | HEIGHT: 64 IN | DIASTOLIC BLOOD PRESSURE: 80 MMHG | RESPIRATION RATE: 16 BRPM | HEART RATE: 84 BPM

## 2018-09-05 DIAGNOSIS — D50.0 IRON DEFICIENCY ANEMIA DUE TO CHRONIC BLOOD LOSS: Primary | ICD-10-CM

## 2018-09-05 DIAGNOSIS — D50.0 IRON DEFICIENCY ANEMIA DUE TO CHRONIC BLOOD LOSS: ICD-10-CM

## 2018-09-05 LAB
BASOPHILS # BLD AUTO: 0.05 10*3/MM3 (ref 0–0.1)
BASOPHILS NFR BLD AUTO: 0.9 % (ref 0–1.1)
DEPRECATED RDW RBC AUTO: 43.3 FL (ref 37–49)
EOSINOPHIL # BLD AUTO: 0.11 10*3/MM3 (ref 0–0.36)
EOSINOPHIL NFR BLD AUTO: 2 % (ref 1–5)
ERYTHROCYTE [DISTWIDTH] IN BLOOD BY AUTOMATED COUNT: 12.4 % (ref 11.7–14.5)
FERRITIN SERPL-MCNC: 50.8 NG/ML (ref 11–207)
HCT VFR BLD AUTO: 37.2 % (ref 34–45)
HGB BLD-MCNC: 12 G/DL (ref 11.5–14.9)
HGB RETIC QN: 36.6 PG (ref 29.8–36.1)
IMM GRANULOCYTES # BLD: 0.03 10*3/MM3 (ref 0–0.03)
IMM GRANULOCYTES NFR BLD: 0.5 % (ref 0–0.5)
IMM RETICS NFR: 16.4 % (ref 3–15.8)
IRON 24H UR-MRATE: 48 MCG/DL (ref 37–145)
IRON SATN MFR SERPL: 16 % (ref 14–48)
LYMPHOCYTES # BLD AUTO: 1.33 10*3/MM3 (ref 1–3.5)
LYMPHOCYTES NFR BLD AUTO: 24.3 % (ref 20–49)
MCH RBC QN AUTO: 30.5 PG (ref 27–33)
MCHC RBC AUTO-ENTMCNC: 32.3 G/DL (ref 32–35)
MCV RBC AUTO: 94.4 FL (ref 83–97)
MONOCYTES # BLD AUTO: 0.36 10*3/MM3 (ref 0.25–0.8)
MONOCYTES NFR BLD AUTO: 6.6 % (ref 4–12)
NEUTROPHILS # BLD AUTO: 3.59 10*3/MM3 (ref 1.5–7)
NEUTROPHILS NFR BLD AUTO: 65.7 % (ref 39–75)
NRBC BLD MANUAL-RTO: 0 /100 WBC (ref 0–0)
PLATELET # BLD AUTO: 283 10*3/MM3 (ref 150–375)
PMV BLD AUTO: 10.1 FL (ref 8.9–12.1)
RBC # BLD AUTO: 3.94 10*6/MM3 (ref 3.9–5)
RETICS/RBC NFR AUTO: 1.36 % (ref 0.6–2)
TIBC SERPL-MCNC: 305 MCG/DL (ref 249–505)
TRANSFERRIN SERPL-MCNC: 218 MG/DL (ref 200–360)
WBC NRBC COR # BLD: 5.47 10*3/MM3 (ref 4–10)

## 2018-09-05 PROCEDURE — 85046 RETICYTE/HGB CONCENTRATE: CPT | Performed by: INTERNAL MEDICINE

## 2018-09-05 PROCEDURE — 84466 ASSAY OF TRANSFERRIN: CPT | Performed by: INTERNAL MEDICINE

## 2018-09-05 PROCEDURE — 36415 COLL VENOUS BLD VENIPUNCTURE: CPT | Performed by: INTERNAL MEDICINE

## 2018-09-05 PROCEDURE — 82728 ASSAY OF FERRITIN: CPT | Performed by: INTERNAL MEDICINE

## 2018-09-05 PROCEDURE — 99213 OFFICE O/P EST LOW 20 MIN: CPT | Performed by: INTERNAL MEDICINE

## 2018-09-05 PROCEDURE — 83540 ASSAY OF IRON: CPT | Performed by: INTERNAL MEDICINE

## 2018-09-05 PROCEDURE — 85025 COMPLETE CBC W/AUTO DIFF WBC: CPT | Performed by: INTERNAL MEDICINE

## 2018-09-05 NOTE — PROGRESS NOTES
[facility] GROUP OUTPATIENT FOLLOW UP CLINIC VISIT    REASON FOR FOLLOW-UP:    1.  Iron deficiency anemia secondary to menorrhagia  2.  Intravenous Feraheme administered on 5/1/2018 and 5/9/2018.  Iron levels did not respond to twice daily ferrous sulfate.    HISTORY OF PRESENT ILLNESS:  Jaskaran Mckay is a 42 y.o. female who returns today for follow up of the above issue.      She continues to feel well.  She continues to have no ice cravings.  She does continue to have some menstrual blood loss H today and when she does start her period she continues to have very heavy and painful periods.    HEMATOLOGIC HISTORY:  She denies any significant bleeding problems prior to the past several years.  She had her wisdom teeth out in the past without excessive bleeding.  She denies any menorrhagia around the time of menarche.  Over the past several years, however, her menstrual periods have become extremely heavy.  The last for about 7 days.  At the beginning of her periods she is changing 2 pads every hour or so.  She had an endometrial ablation performed on 3/12/2018 and had a typical menstrual.  For her in early April.  They are waiting to see how her.  In May is before deciding on a hysterectomy but she has decided that she does want this done.  She has required packed red blood cells in the past.  She has been on oral iron, ferrous sulfate, twice daily for years.  She tolerates this well.  Her last transfusion was in September 2017.   Iron studies done at [place] in September 2017 showed an iron of 37% iron saturation and a ferritin of 5.8.  Recent labs on 3/5/2018 showed a hemoglobin of 7.1 with hematocrit 25.5% and MCV of 72.2.  She has never had intravenous iron.  She has not tried tranexamic acid for the menorrhagia.    ALLERGIES:  Allergies   Allergen Reactions   • Penicillins Rash     Childhood allergy, has taken cefazolin in ppast       MEDICATIONS:  The medication list has been reviewed with the patient  "by the medical assistant, and the list has been updated in the electronic medical record, which I reviewed.  Medication dosages and frequencies were confirmed to be accurate.    REVIEW OF SYSTEMS:  PAIN:  See Vital Signs below.  GENERAL:  No fevers, chills, night sweats, or unintended weight loss.  SKIN:  No rashes or non-healing lesions.  HEME/LYMPH:  No abnormal bleeding aside from menorrhagia.  No palpable lymphadenopathy.  EYES:  No vision changes or diplopia.  ENT:  No sore throat or difficulty swallowing.  RESPIRATORY:  No cough, shortness of breath, hemoptysis, or wheezing.  CARDIOVASCULAR:  No chest pain, palpitations, orthopnea, or dyspnea on exertion.  GASTROINTESTINAL:  No abdominal pain, nausea, vomiting, constipation, diarrhea, melena, or hematochezia.  GENITOURINARY:  No dysuria or hematuria.  Menorrhagia persists  MUSCULOSKELETAL:  No joint pain, swelling, or erythema.  NEUROLOGIC:  No dizziness, loss of consciousness, or seizures.  PSYCHIATRIC:  No depression, anxiety, or mood changes.    Vitals:    09/05/18 1340   BP: 130/80   Pulse: 84   Resp: 16   Temp: 97.9 °F (36.6 °C)   SpO2: 100%   Weight: 105 kg (232 lb 3.2 oz)   Height: 162 cm (63.78\")   PainSc: 0-No pain       PHYSICAL EXAMINATION:  GENERAL:  Well-developed well-nourished female; awake, alert and oriented, in no acute distress.  SKIN:  Warm and dry, without rashes, purpura, or petechiae.  HEAD:  Normocephalic, atraumatic.  EYES:  Pupils equal, round and reactive to light.  Extraocular movements intact.  Conjunctivae normal.  EARS:  Hearing intact.  NOSE:  Septum midline.  No excoriations or nasal discharge.  MOUTH:  No stomatitis or ulcers.  Lips are normal.  THROAT:  Oropharynx without lesions or exudates.  LYMPHATICS:  No cervical, supraclavicular, or axillary lymphadenopathy.  CHEST:  Lungs are clear to auscultation bilaterally.  No wheezes, rales, or rhonchi.  HEART:  Regular rate; normal rhythm.  No murmurs, gallops or " rubs.  EXTREMITIES:  No clubbing, cyanosis, or edema.  NEUROLOGICAL:  No focal neurologic deficits.    DIAGNOSTIC DATA:  Results for orders placed or performed in visit on 09/05/18   Retic With IRF & RET-He   Result Value Ref Range    Immature Reticulocyte Fraction 16.4 (H) 3.0 - 15.8 %    Reticulocyte % 1.36 0.60 - 2.00 %    Reticulocyte Hgb 36.6 (H) 29.8 - 36.1 pg   CBC Auto Differential   Result Value Ref Range    WBC 5.47 4.00 - 10.00 10*3/mm3    RBC 3.94 3.90 - 5.00 10*6/mm3    Hemoglobin 12.0 11.5 - 14.9 g/dL    Hematocrit 37.2 34.0 - 45.0 %    MCV 94.4 83.0 - 97.0 fL    MCH 30.5 27.0 - 33.0 pg    MCHC 32.3 32.0 - 35.0 g/dL    RDW 12.4 11.7 - 14.5 %    RDW-SD 43.3 37.0 - 49.0 fl    MPV 10.1 8.9 - 12.1 fL    Platelets 283 150 - 375 10*3/mm3    Neutrophil % 65.7 39.0 - 75.0 %    Lymphocyte % 24.3 20.0 - 49.0 %    Monocyte % 6.6 4.0 - 12.0 %    Eosinophil % 2.0 1.0 - 5.0 %    Basophil % 0.9 0.0 - 1.1 %    Immature Grans % 0.5 0.0 - 0.5 %    Neutrophils, Absolute 3.59 1.50 - 7.00 10*3/mm3    Lymphocytes, Absolute 1.33 1.00 - 3.50 10*3/mm3    Monocytes, Absolute 0.36 0.25 - 0.80 10*3/mm3    Eosinophils, Absolute 0.11 0.00 - 0.36 10*3/mm3    Basophils, Absolute 0.05 0.00 - 0.10 10*3/mm3    Immature Grans, Absolute 0.03 0.00 - 0.03 10*3/mm3    nRBC 0.0 0.0 - 0.0 /100 WBC       IMAGING:  None reviewed    ASSESSMENT:  This is a 42 y.o. female with:  1.  Iron deficiency anemia secondary to menorrhagia: Her iron levels did not respond to ferrous sulfate twice daily.  She is no longer taking this.  Her hemoglobin has normalized after 2 doses of intravenous Feraheme and she is feeling much better with resolution of her ice craving.  We will continue to monitor her for recurrent iron deficiency.  As long as she is having significant metromenorrhagia she is at risk for further iron deficiency.  2.  Metromenorrhagia: No other history of easy bleeding.  She did have an endometrial ablation performed on 3/12/2018 but the issue  persists.  She will follow-up with Dr. Martin regarding this next week.    PLAN:  1.  I will see her back in about 4 months with labs including CBC, reticulocyte count, ferritin, and iron profile

## 2018-11-14 ENCOUNTER — APPOINTMENT (OUTPATIENT)
Dept: PREADMISSION TESTING | Facility: HOSPITAL | Age: 43
End: 2018-11-14

## 2018-11-14 VITALS
TEMPERATURE: 97.1 F | WEIGHT: 233.1 LBS | HEIGHT: 64 IN | RESPIRATION RATE: 16 BRPM | SYSTOLIC BLOOD PRESSURE: 148 MMHG | OXYGEN SATURATION: 100 % | BODY MASS INDEX: 39.79 KG/M2 | DIASTOLIC BLOOD PRESSURE: 88 MMHG | HEART RATE: 74 BPM

## 2018-11-14 LAB
ABO GROUP BLD: NORMAL
ANION GAP SERPL CALCULATED.3IONS-SCNC: 9.9 MMOL/L
BASOPHILS # BLD AUTO: 0.03 10*3/MM3 (ref 0–0.2)
BASOPHILS NFR BLD AUTO: 0.6 % (ref 0–1.5)
BLD GP AB SCN SERPL QL: NEGATIVE
BUN BLD-MCNC: 8 MG/DL (ref 6–20)
BUN/CREAT SERPL: 11 (ref 7–25)
CALCIUM SPEC-SCNC: 9.4 MG/DL (ref 8.6–10.5)
CHLORIDE SERPL-SCNC: 101 MMOL/L (ref 98–107)
CO2 SERPL-SCNC: 24.1 MMOL/L (ref 22–29)
CREAT BLD-MCNC: 0.73 MG/DL (ref 0.57–1)
DEPRECATED RDW RBC AUTO: 43.7 FL (ref 37–54)
EOSINOPHIL # BLD AUTO: 0.07 10*3/MM3 (ref 0–0.7)
EOSINOPHIL NFR BLD AUTO: 1.3 % (ref 0.3–6.2)
ERYTHROCYTE [DISTWIDTH] IN BLOOD BY AUTOMATED COUNT: 12.7 % (ref 11.7–13)
GFR SERPL CREATININE-BSD FRML MDRD: 88 ML/MIN/1.73
GLUCOSE BLD-MCNC: 95 MG/DL (ref 65–99)
HCG SERPL QL: NEGATIVE
HCT VFR BLD AUTO: 37.3 % (ref 35.6–45.5)
HGB BLD-MCNC: 12.2 G/DL (ref 11.9–15.5)
IMM GRANULOCYTES # BLD: 0.02 10*3/MM3 (ref 0–0.03)
IMM GRANULOCYTES NFR BLD: 0.4 % (ref 0–0.5)
LYMPHOCYTES # BLD AUTO: 1.42 10*3/MM3 (ref 0.9–4.8)
LYMPHOCYTES NFR BLD AUTO: 27 % (ref 19.6–45.3)
MCH RBC QN AUTO: 30.7 PG (ref 26.9–32)
MCHC RBC AUTO-ENTMCNC: 32.7 G/DL (ref 32.4–36.3)
MCV RBC AUTO: 93.7 FL (ref 80.5–98.2)
MONOCYTES # BLD AUTO: 0.4 10*3/MM3 (ref 0.2–1.2)
MONOCYTES NFR BLD AUTO: 7.6 % (ref 5–12)
NEUTROPHILS # BLD AUTO: 3.34 10*3/MM3 (ref 1.9–8.1)
NEUTROPHILS NFR BLD AUTO: 63.5 % (ref 42.7–76)
PLATELET # BLD AUTO: 325 10*3/MM3 (ref 140–500)
PMV BLD AUTO: 10.2 FL (ref 6–12)
POTASSIUM BLD-SCNC: 3.7 MMOL/L (ref 3.5–5.2)
RBC # BLD AUTO: 3.98 10*6/MM3 (ref 3.9–5.2)
RH BLD: POSITIVE
SODIUM BLD-SCNC: 135 MMOL/L (ref 136–145)
T&S EXPIRATION DATE: NORMAL
WBC NRBC COR # BLD: 5.26 10*3/MM3 (ref 4.5–10.7)

## 2018-11-14 PROCEDURE — 86900 BLOOD TYPING SEROLOGIC ABO: CPT | Performed by: OBSTETRICS & GYNECOLOGY

## 2018-11-14 PROCEDURE — 86850 RBC ANTIBODY SCREEN: CPT | Performed by: OBSTETRICS & GYNECOLOGY

## 2018-11-14 PROCEDURE — 86901 BLOOD TYPING SEROLOGIC RH(D): CPT | Performed by: OBSTETRICS & GYNECOLOGY

## 2018-11-14 PROCEDURE — 80048 BASIC METABOLIC PNL TOTAL CA: CPT | Performed by: OBSTETRICS & GYNECOLOGY

## 2018-11-14 PROCEDURE — 36415 COLL VENOUS BLD VENIPUNCTURE: CPT

## 2018-11-14 PROCEDURE — 84703 CHORIONIC GONADOTROPIN ASSAY: CPT | Performed by: OBSTETRICS & GYNECOLOGY

## 2018-11-14 PROCEDURE — 85025 COMPLETE CBC W/AUTO DIFF WBC: CPT | Performed by: OBSTETRICS & GYNECOLOGY

## 2018-11-14 RX ORDER — FLUTICASONE PROPIONATE 50 MCG
2 SPRAY, SUSPENSION (ML) NASAL DAILY PRN
COMMUNITY
Start: 2017-12-22

## 2018-11-14 RX ORDER — NAPROXEN 500 MG/1
500 TABLET ORAL AS NEEDED
COMMUNITY

## 2018-11-14 NOTE — DISCHARGE INSTRUCTIONS
Take the following medications the morning of surgery with a small sip of water:  NONE    Arrive to hospital on your day of surgery at 6:00 AM.      General Instructions:  • Do not eat solid food after midnight the night before surgery.  • You may drink clear liquids day of surgery but must stop at least one hour before your hospital arrival time.  • It is beneficial for you to have a clear drink that contains carbohydrates the day of surgery.  We suggest a 12 to 20 ounce bottle of Gatorade or Powerade for non-diabetic patients or a 12 to 20 ounce bottle of G2 or Powerade Zero for diabetic patients. (Pediatric patients, are not advised to drink a 12 to 20 ounce carbohydrate drink)    Clear liquids are liquids you can see through.  Nothing red in color.     Plain water                               Sports drinks  Sodas                                   Gelatin (Jell-O)  Fruit juices without pulp such as white grape juice and apple juice  Popsicles that contain no fruit or yogurt  Tea or coffee (no cream or milk added)  Gatorade / Powerade  G2 / Powerade Zero    • Infants may have breast milk up to four hours before surgery.  • Infants drinking formula may drink formula up to six hours before surgery.   • Patients who avoid smoking, chewing tobacco and alcohol for 4 weeks prior to surgery have a reduced risk of post-operative complications.  Quit smoking as many days before surgery as you can.  • Do not smoke, use chewing tobacco or drink alcohol the day of surgery.   • If applicable bring your C-PAP/ BI-PAP machine.  • Bring any papers given to you in the doctor’s office.  • Wear clean comfortable clothes and socks.  • Do not wear contact lenses or make-up.  Bring a case for your glasses.   • Bring crutches or walker if applicable.  • Remove all piercings.  Leave jewelry and any other valuables at home.  • Hair extensions with metal clips must be removed prior to surgery.  • The Pre-Admission Testing nurse will  instruct you to bring medications if unable to obtain an accurate list in Pre-Admission Testing.        If you were given a blood bank ID arm band remember to bring it with you the day of surgery.    Preventing a Surgical Site Infection:  • For 2 to 3 days before surgery, avoid shaving with a razor because the razor can irritate skin and make it easier to develop an infection.    • Any areas of open skin can increase the risk of a post-operative wound infection by allowing bacteria to enter and travel throughout the body.  Notify your surgeon if you have any skin wounds / rashes even if it is not near the expected surgical site.  The area will need assessed to determine if surgery should be delayed until it is healed.  • The night prior to surgery sleep in a clean bed with clean clothing.  Do not allow pets to sleep with you.  • Shower on the morning of surgery using a fresh bar of anti-bacterial soap (such as Dial) and clean washcloth.  Dry with a clean towel and dress in clean clothing.  • Ask your surgeon if you will be receiving antibiotics prior to surgery.  • Make sure you, your family, and all healthcare providers clean their hands with soap and water or an alcohol based hand  before caring for you or your wound.    Day of surgery:  Upon arrival, a Pre-op nurse and Anesthesiologist will review your health history, obtain vital signs, and answer questions you may have.  The only belongings needed at this time will be your home medications and if applicable your C-PAP/BI-PAP machine.  If you are staying overnight your family can leave the rest of your belongings in the car and bring them to your room later.  A Pre-op nurse will start an IV and you may receive medication in preparation for surgery, including something to help you relax.  Your family will be able to see you in the Pre-op area.  While you are in surgery your family should notify the waiting room  if they leave the waiting room  area and provide a contact phone number.    Please be aware that surgery does come with discomfort.  We want to make every effort to control your discomfort so please discuss any uncontrolled symptoms with your nurse.   Your doctor will most likely have prescribed pain medications.      If you are going home after surgery you will receive individualized written care instructions before being discharged.  A responsible adult must drive you to and from the hospital on the day of your surgery and stay with you for 24 hours.    If you are staying overnight following surgery, you will be transported to your hospital room following the recovery period.  [facility] has all private rooms.    You have received a list of surgical assistants for your reference.  If you have any questions please call Pre-Admission Testing at [phone].  Deductibles and co-payments are collected on the day of service. Please be prepared to pay the required co-pay, deductible or deposit on the day of service as defined by your plan.

## 2018-11-19 ENCOUNTER — ANESTHESIA (OUTPATIENT)
Dept: PERIOP | Facility: HOSPITAL | Age: 43
End: 2018-11-19

## 2018-11-19 ENCOUNTER — ANESTHESIA EVENT (OUTPATIENT)
Dept: PERIOP | Facility: HOSPITAL | Age: 43
End: 2018-11-19

## 2018-11-19 ENCOUNTER — APPOINTMENT (OUTPATIENT)
Dept: GENERAL RADIOLOGY | Facility: HOSPITAL | Age: 43
End: 2018-11-19

## 2018-11-19 ENCOUNTER — HOSPITAL ENCOUNTER (OUTPATIENT)
Facility: HOSPITAL | Age: 43
Discharge: HOME | End: 2018-11-21
Attending: OBSTETRICS & GYNECOLOGY | Admitting: OBSTETRICS & GYNECOLOGY

## 2018-11-19 DIAGNOSIS — N92.0 MENORRHAGIA: ICD-10-CM

## 2018-11-19 LAB
ALBUMIN SERPL-MCNC: 3.8 G/DL (ref 3.5–5.2)
ALBUMIN/GLOB SERPL: 0.9 G/DL
ALP SERPL-CCNC: 63 U/L (ref 39–117)
ALT SERPL W P-5'-P-CCNC: 26 U/L (ref 1–33)
ANION GAP SERPL CALCULATED.3IONS-SCNC: 14.5 MMOL/L
ANION GAP SERPL CALCULATED.3IONS-SCNC: 16.1 MMOL/L
AST SERPL-CCNC: 23 U/L (ref 1–32)
B-HCG UR QL: NEGATIVE
BASOPHILS # BLD AUTO: 0.01 10*3/MM3 (ref 0–0.2)
BASOPHILS NFR BLD AUTO: 0.1 % (ref 0–1.5)
BILIRUB SERPL-MCNC: 0.8 MG/DL (ref 0.1–1.2)
BUN BLD-MCNC: 7 MG/DL (ref 6–20)
BUN BLD-MCNC: 7 MG/DL (ref 6–20)
BUN/CREAT SERPL: 7.8 (ref 7–25)
BUN/CREAT SERPL: 8.3 (ref 7–25)
CALCIUM SPEC-SCNC: 8.5 MG/DL (ref 8.6–10.5)
CALCIUM SPEC-SCNC: 8.6 MG/DL (ref 8.6–10.5)
CHLORIDE SERPL-SCNC: 95 MMOL/L (ref 98–107)
CHLORIDE SERPL-SCNC: 96 MMOL/L (ref 98–107)
CO2 SERPL-SCNC: 20.9 MMOL/L (ref 22–29)
CO2 SERPL-SCNC: 24.5 MMOL/L (ref 22–29)
CREAT BLD-MCNC: 0.84 MG/DL (ref 0.57–1)
CREAT BLD-MCNC: 0.9 MG/DL (ref 0.57–1)
DEPRECATED RDW RBC AUTO: 43.2 FL (ref 37–54)
EOSINOPHIL # BLD AUTO: 0 10*3/MM3 (ref 0–0.7)
EOSINOPHIL NFR BLD AUTO: 0 % (ref 0.3–6.2)
ERYTHROCYTE [DISTWIDTH] IN BLOOD BY AUTOMATED COUNT: 12.6 % (ref 11.7–13)
GFR SERPL CREATININE-BSD FRML MDRD: 69 ML/MIN/1.73
GFR SERPL CREATININE-BSD FRML MDRD: 75 ML/MIN/1.73
GLOBULIN UR ELPH-MCNC: 4.4 GM/DL
GLUCOSE BLD-MCNC: 206 MG/DL (ref 65–99)
GLUCOSE BLD-MCNC: 234 MG/DL (ref 65–99)
HCT VFR BLD AUTO: 39 % (ref 35.6–45.5)
HGB BLD-MCNC: 12.7 G/DL (ref 11.9–15.5)
IMM GRANULOCYTES # BLD: 0.03 10*3/MM3 (ref 0–0.03)
IMM GRANULOCYTES NFR BLD: 0.2 % (ref 0–0.5)
INTERNAL NEGATIVE CONTROL: NEGATIVE
INTERNAL POSITIVE CONTROL: POSITIVE
LYMPHOCYTES # BLD AUTO: 0.65 10*3/MM3 (ref 0.9–4.8)
LYMPHOCYTES NFR BLD AUTO: 4.8 % (ref 19.6–45.3)
Lab: NORMAL
MCH RBC QN AUTO: 30.8 PG (ref 26.9–32)
MCHC RBC AUTO-ENTMCNC: 32.6 G/DL (ref 32.4–36.3)
MCV RBC AUTO: 94.4 FL (ref 80.5–98.2)
MONOCYTES # BLD AUTO: 0.14 10*3/MM3 (ref 0.2–1.2)
MONOCYTES NFR BLD AUTO: 1 % (ref 5–12)
NEUTROPHILS # BLD AUTO: 12.65 10*3/MM3 (ref 1.9–8.1)
NEUTROPHILS NFR BLD AUTO: 94.1 % (ref 42.7–76)
NT-PROBNP SERPL-MCNC: 52.2 PG/ML (ref 5–450)
PLATELET # BLD AUTO: 304 10*3/MM3 (ref 140–500)
PMV BLD AUTO: 10.2 FL (ref 6–12)
POTASSIUM BLD-SCNC: 3.9 MMOL/L (ref 3.5–5.2)
POTASSIUM BLD-SCNC: 4 MMOL/L (ref 3.5–5.2)
PROT SERPL-MCNC: 8.2 G/DL (ref 6–8.5)
RBC # BLD AUTO: 4.13 10*6/MM3 (ref 3.9–5.2)
SODIUM BLD-SCNC: 133 MMOL/L (ref 136–145)
SODIUM BLD-SCNC: 134 MMOL/L (ref 136–145)
WBC NRBC COR # BLD: 13.45 10*3/MM3 (ref 4.5–10.7)

## 2018-11-19 PROCEDURE — 83880 ASSAY OF NATRIURETIC PEPTIDE: CPT | Performed by: INTERNAL MEDICINE

## 2018-11-19 PROCEDURE — 94799 UNLISTED PULMONARY SVC/PX: CPT

## 2018-11-19 PROCEDURE — 25010000003 CEFAZOLIN IN DEXTROSE 2-4 GM/100ML-% SOLUTION: Performed by: OBSTETRICS & GYNECOLOGY

## 2018-11-19 PROCEDURE — 80048 BASIC METABOLIC PNL TOTAL CA: CPT | Performed by: INTERNAL MEDICINE

## 2018-11-19 PROCEDURE — 25010000002 FENTANYL CITRATE (PF) 100 MCG/2ML SOLUTION: Performed by: NURSE ANESTHETIST, CERTIFIED REGISTERED

## 2018-11-19 PROCEDURE — 71045 X-RAY EXAM CHEST 1 VIEW: CPT

## 2018-11-19 PROCEDURE — 25010000002 HYDROMORPHONE PER 4 MG: Performed by: NURSE ANESTHETIST, CERTIFIED REGISTERED

## 2018-11-19 PROCEDURE — 25010000002 PROMETHAZINE PER 50 MG: Performed by: OBSTETRICS & GYNECOLOGY

## 2018-11-19 PROCEDURE — G0378 HOSPITAL OBSERVATION PER HR: HCPCS

## 2018-11-19 PROCEDURE — 25010000002 FENTANYL CITRATE (PF) 100 MCG/2ML SOLUTION: Performed by: ANESTHESIOLOGY

## 2018-11-19 PROCEDURE — 88307 TISSUE EXAM BY PATHOLOGIST: CPT | Performed by: OBSTETRICS & GYNECOLOGY

## 2018-11-19 PROCEDURE — 85025 COMPLETE CBC W/AUTO DIFF WBC: CPT | Performed by: OBSTETRICS & GYNECOLOGY

## 2018-11-19 PROCEDURE — 25010000002 ONDANSETRON PER 1 MG: Performed by: NURSE ANESTHETIST, CERTIFIED REGISTERED

## 2018-11-19 PROCEDURE — 25010000002 MIDAZOLAM PER 1 MG: Performed by: ANESTHESIOLOGY

## 2018-11-19 PROCEDURE — 94640 AIRWAY INHALATION TREATMENT: CPT

## 2018-11-19 PROCEDURE — 25010000002 ROPIVACAINE PER 1 MG: Performed by: OBSTETRICS & GYNECOLOGY

## 2018-11-19 PROCEDURE — 25010000002 DEXAMETHASONE PER 1 MG: Performed by: NURSE ANESTHETIST, CERTIFIED REGISTERED

## 2018-11-19 PROCEDURE — 25010000002 SUCCINYLCHOLINE PER 20 MG: Performed by: NURSE ANESTHETIST, CERTIFIED REGISTERED

## 2018-11-19 PROCEDURE — 25010000002 ONDANSETRON PER 1 MG: Performed by: OBSTETRICS & GYNECOLOGY

## 2018-11-19 PROCEDURE — 81025 URINE PREGNANCY TEST: CPT | Performed by: ANESTHESIOLOGY

## 2018-11-19 PROCEDURE — 25010000002 FUROSEMIDE PER 20 MG: Performed by: INTERNAL MEDICINE

## 2018-11-19 PROCEDURE — 25010000002 PROPOFOL 10 MG/ML EMULSION: Performed by: NURSE ANESTHETIST, CERTIFIED REGISTERED

## 2018-11-19 PROCEDURE — 25010000002 FUROSEMIDE PER 20 MG

## 2018-11-19 PROCEDURE — 25010000002 HYDROMORPHONE PER 4 MG: Performed by: OBSTETRICS & GYNECOLOGY

## 2018-11-19 PROCEDURE — 80053 COMPREHEN METABOLIC PANEL: CPT | Performed by: OBSTETRICS & GYNECOLOGY

## 2018-11-19 RX ORDER — PROPOFOL 10 MG/ML
VIAL (ML) INTRAVENOUS AS NEEDED
Status: DISCONTINUED | OUTPATIENT
Start: 2018-11-19 | End: 2018-11-19 | Stop reason: SURG

## 2018-11-19 RX ORDER — FUROSEMIDE 10 MG/ML
40 INJECTION INTRAMUSCULAR; INTRAVENOUS ONCE
Status: COMPLETED | OUTPATIENT
Start: 2018-11-19 | End: 2018-11-19

## 2018-11-19 RX ORDER — SIMETHICONE 80 MG
80 TABLET,CHEWABLE ORAL 4 TIMES DAILY PRN
Status: DISCONTINUED | OUTPATIENT
Start: 2018-11-19 | End: 2018-11-21 | Stop reason: HOSPADM

## 2018-11-19 RX ORDER — CALCIUM CARBONATE 200(500)MG
2 TABLET,CHEWABLE ORAL 3 TIMES DAILY PRN
Status: DISCONTINUED | OUTPATIENT
Start: 2018-11-19 | End: 2018-11-21 | Stop reason: HOSPADM

## 2018-11-19 RX ORDER — DOCUSATE SODIUM 100 MG/1
100 CAPSULE, LIQUID FILLED ORAL 2 TIMES DAILY PRN
Status: DISCONTINUED | OUTPATIENT
Start: 2018-11-19 | End: 2018-11-21 | Stop reason: HOSPADM

## 2018-11-19 RX ORDER — PROMETHAZINE HYDROCHLORIDE 25 MG/ML
12.5 INJECTION, SOLUTION INTRAMUSCULAR; INTRAVENOUS EVERY 6 HOURS PRN
Status: DISCONTINUED | OUTPATIENT
Start: 2018-11-19 | End: 2018-11-21 | Stop reason: HOSPADM

## 2018-11-19 RX ORDER — FENTANYL CITRATE 50 UG/ML
50 INJECTION, SOLUTION INTRAMUSCULAR; INTRAVENOUS
Status: DISCONTINUED | OUTPATIENT
Start: 2018-11-19 | End: 2018-11-19 | Stop reason: HOSPADM

## 2018-11-19 RX ORDER — CELECOXIB 200 MG/1
400 CAPSULE ORAL ONCE
Status: COMPLETED | OUTPATIENT
Start: 2018-11-19 | End: 2018-11-19

## 2018-11-19 RX ORDER — LABETALOL HYDROCHLORIDE 5 MG/ML
5 INJECTION, SOLUTION INTRAVENOUS
Status: DISCONTINUED | OUTPATIENT
Start: 2018-11-19 | End: 2018-11-19 | Stop reason: HOSPADM

## 2018-11-19 RX ORDER — ALBUTEROL SULFATE 2.5 MG/3ML
2.5 SOLUTION RESPIRATORY (INHALATION) EVERY 4 HOURS PRN
Status: DISCONTINUED | OUTPATIENT
Start: 2018-11-19 | End: 2018-11-21 | Stop reason: HOSPADM

## 2018-11-19 RX ORDER — PROMETHAZINE HYDROCHLORIDE 25 MG/1
25 TABLET ORAL ONCE AS NEEDED
Status: DISCONTINUED | OUTPATIENT
Start: 2018-11-19 | End: 2018-11-19 | Stop reason: HOSPADM

## 2018-11-19 RX ORDER — OXYCODONE AND ACETAMINOPHEN 10; 325 MG/1; MG/1
1 TABLET ORAL EVERY 4 HOURS PRN
Status: DISCONTINUED | OUTPATIENT
Start: 2018-11-19 | End: 2018-11-21 | Stop reason: HOSPADM

## 2018-11-19 RX ORDER — EPHEDRINE SULFATE 50 MG/ML
5 INJECTION, SOLUTION INTRAVENOUS ONCE AS NEEDED
Status: DISCONTINUED | OUTPATIENT
Start: 2018-11-19 | End: 2018-11-19 | Stop reason: HOSPADM

## 2018-11-19 RX ORDER — MIDAZOLAM HYDROCHLORIDE 1 MG/ML
1 INJECTION INTRAMUSCULAR; INTRAVENOUS
Status: DISCONTINUED | OUTPATIENT
Start: 2018-11-19 | End: 2018-11-19 | Stop reason: HOSPADM

## 2018-11-19 RX ORDER — SODIUM CHLORIDE, SODIUM LACTATE, POTASSIUM CHLORIDE, CALCIUM CHLORIDE 600; 310; 30; 20 MG/100ML; MG/100ML; MG/100ML; MG/100ML
9 INJECTION, SOLUTION INTRAVENOUS CONTINUOUS
Status: DISCONTINUED | OUTPATIENT
Start: 2018-11-19 | End: 2018-11-19 | Stop reason: HOSPADM

## 2018-11-19 RX ORDER — FUROSEMIDE 10 MG/ML
20 INJECTION INTRAMUSCULAR; INTRAVENOUS ONCE
Status: COMPLETED | OUTPATIENT
Start: 2018-11-19 | End: 2018-11-19

## 2018-11-19 RX ORDER — MAGNESIUM HYDROXIDE 1200 MG/15ML
LIQUID ORAL AS NEEDED
Status: DISCONTINUED | OUTPATIENT
Start: 2018-11-19 | End: 2018-11-19 | Stop reason: HOSPADM

## 2018-11-19 RX ORDER — ONDANSETRON 2 MG/ML
INJECTION INTRAMUSCULAR; INTRAVENOUS AS NEEDED
Status: DISCONTINUED | OUTPATIENT
Start: 2018-11-19 | End: 2018-11-19 | Stop reason: SURG

## 2018-11-19 RX ORDER — HYDROMORPHONE HCL 110MG/55ML
PATIENT CONTROLLED ANALGESIA SYRINGE INTRAVENOUS AS NEEDED
Status: DISCONTINUED | OUTPATIENT
Start: 2018-11-19 | End: 2018-11-19 | Stop reason: SURG

## 2018-11-19 RX ORDER — PROMETHAZINE HYDROCHLORIDE 25 MG/ML
12.5 INJECTION, SOLUTION INTRAMUSCULAR; INTRAVENOUS ONCE AS NEEDED
Status: DISCONTINUED | OUTPATIENT
Start: 2018-11-19 | End: 2018-11-19 | Stop reason: HOSPADM

## 2018-11-19 RX ORDER — ROCURONIUM BROMIDE 10 MG/ML
INJECTION, SOLUTION INTRAVENOUS AS NEEDED
Status: DISCONTINUED | OUTPATIENT
Start: 2018-11-19 | End: 2018-11-19 | Stop reason: SURG

## 2018-11-19 RX ORDER — ONDANSETRON 2 MG/ML
4 INJECTION INTRAMUSCULAR; INTRAVENOUS ONCE AS NEEDED
Status: DISCONTINUED | OUTPATIENT
Start: 2018-11-19 | End: 2018-11-19 | Stop reason: HOSPADM

## 2018-11-19 RX ORDER — FAMOTIDINE 10 MG/ML
20 INJECTION, SOLUTION INTRAVENOUS ONCE
Status: COMPLETED | OUTPATIENT
Start: 2018-11-19 | End: 2018-11-19

## 2018-11-19 RX ORDER — ONDANSETRON 4 MG/1
4 TABLET, ORALLY DISINTEGRATING ORAL EVERY 6 HOURS PRN
Status: DISCONTINUED | OUTPATIENT
Start: 2018-11-19 | End: 2018-11-21 | Stop reason: HOSPADM

## 2018-11-19 RX ORDER — MEPERIDINE HYDROCHLORIDE 25 MG/ML
12.5 INJECTION INTRAMUSCULAR; INTRAVENOUS; SUBCUTANEOUS
Status: DISCONTINUED | OUTPATIENT
Start: 2018-11-19 | End: 2018-11-19 | Stop reason: HOSPADM

## 2018-11-19 RX ORDER — HYDROCODONE BITARTRATE AND ACETAMINOPHEN 7.5; 325 MG/1; MG/1
1 TABLET ORAL ONCE AS NEEDED
Status: DISCONTINUED | OUTPATIENT
Start: 2018-11-19 | End: 2018-11-19 | Stop reason: HOSPADM

## 2018-11-19 RX ORDER — FAMOTIDINE 20 MG/1
20 TABLET, FILM COATED ORAL 2 TIMES DAILY
Status: DISCONTINUED | OUTPATIENT
Start: 2018-11-19 | End: 2018-11-21 | Stop reason: HOSPADM

## 2018-11-19 RX ORDER — LIDOCAINE HYDROCHLORIDE 10 MG/ML
0.5 INJECTION, SOLUTION EPIDURAL; INFILTRATION; INTRACAUDAL; PERINEURAL ONCE AS NEEDED
Status: DISCONTINUED | OUTPATIENT
Start: 2018-11-19 | End: 2018-11-19 | Stop reason: HOSPADM

## 2018-11-19 RX ORDER — ZOLPIDEM TARTRATE 5 MG/1
5 TABLET ORAL NIGHTLY PRN
Status: DISCONTINUED | OUTPATIENT
Start: 2018-11-19 | End: 2018-11-21 | Stop reason: HOSPADM

## 2018-11-19 RX ORDER — HYDROMORPHONE HYDROCHLORIDE 1 MG/ML
0.5 INJECTION, SOLUTION INTRAMUSCULAR; INTRAVENOUS; SUBCUTANEOUS
Status: DISCONTINUED | OUTPATIENT
Start: 2018-11-19 | End: 2018-11-19 | Stop reason: HOSPADM

## 2018-11-19 RX ORDER — PROMETHAZINE HYDROCHLORIDE 12.5 MG/1
12.5 TABLET ORAL EVERY 6 HOURS PRN
Status: DISCONTINUED | OUTPATIENT
Start: 2018-11-19 | End: 2018-11-21 | Stop reason: HOSPADM

## 2018-11-19 RX ORDER — ONDANSETRON 2 MG/ML
4 INJECTION INTRAMUSCULAR; INTRAVENOUS EVERY 6 HOURS PRN
Status: DISCONTINUED | OUTPATIENT
Start: 2018-11-19 | End: 2018-11-21 | Stop reason: HOSPADM

## 2018-11-19 RX ORDER — LIDOCAINE HYDROCHLORIDE 20 MG/ML
INJECTION, SOLUTION INFILTRATION; PERINEURAL AS NEEDED
Status: DISCONTINUED | OUTPATIENT
Start: 2018-11-19 | End: 2018-11-19 | Stop reason: SURG

## 2018-11-19 RX ORDER — SODIUM CHLORIDE 9 MG/ML
INJECTION, SOLUTION INTRAVENOUS AS NEEDED
Status: DISCONTINUED | OUTPATIENT
Start: 2018-11-19 | End: 2018-11-19 | Stop reason: HOSPADM

## 2018-11-19 RX ORDER — NAPROXEN 500 MG/1
500 TABLET ORAL 2 TIMES DAILY PRN
Status: DISCONTINUED | OUTPATIENT
Start: 2018-11-19 | End: 2018-11-20

## 2018-11-19 RX ORDER — PROMETHAZINE HYDROCHLORIDE 25 MG/1
12.5 SUPPOSITORY RECTAL EVERY 6 HOURS PRN
Status: DISCONTINUED | OUTPATIENT
Start: 2018-11-19 | End: 2018-11-21 | Stop reason: HOSPADM

## 2018-11-19 RX ORDER — PROMETHAZINE HYDROCHLORIDE 25 MG/1
12.5 TABLET ORAL ONCE AS NEEDED
Status: DISCONTINUED | OUTPATIENT
Start: 2018-11-19 | End: 2018-11-19 | Stop reason: HOSPADM

## 2018-11-19 RX ORDER — LABETALOL HYDROCHLORIDE 5 MG/ML
INJECTION, SOLUTION INTRAVENOUS AS NEEDED
Status: DISCONTINUED | OUTPATIENT
Start: 2018-11-19 | End: 2018-11-19 | Stop reason: SURG

## 2018-11-19 RX ORDER — SODIUM CHLORIDE, SODIUM LACTATE, POTASSIUM CHLORIDE, CALCIUM CHLORIDE 600; 310; 30; 20 MG/100ML; MG/100ML; MG/100ML; MG/100ML
INJECTION, SOLUTION INTRAVENOUS CONTINUOUS PRN
Status: DISCONTINUED | OUTPATIENT
Start: 2018-11-19 | End: 2018-11-19 | Stop reason: SURG

## 2018-11-19 RX ORDER — DEXAMETHASONE SODIUM PHOSPHATE 10 MG/ML
INJECTION INTRAMUSCULAR; INTRAVENOUS AS NEEDED
Status: DISCONTINUED | OUTPATIENT
Start: 2018-11-19 | End: 2018-11-19 | Stop reason: SURG

## 2018-11-19 RX ORDER — OXYCODONE HYDROCHLORIDE AND ACETAMINOPHEN 5; 325 MG/1; MG/1
1 TABLET ORAL EVERY 4 HOURS PRN
Status: DISCONTINUED | OUTPATIENT
Start: 2018-11-19 | End: 2018-11-21 | Stop reason: HOSPADM

## 2018-11-19 RX ORDER — NALOXONE HCL 0.4 MG/ML
0.1 VIAL (ML) INJECTION
Status: DISCONTINUED | OUTPATIENT
Start: 2018-11-19 | End: 2018-11-21 | Stop reason: HOSPADM

## 2018-11-19 RX ORDER — SODIUM CHLORIDE 0.9 % (FLUSH) 0.9 %
1-10 SYRINGE (ML) INJECTION AS NEEDED
Status: DISCONTINUED | OUTPATIENT
Start: 2018-11-19 | End: 2018-11-19 | Stop reason: HOSPADM

## 2018-11-19 RX ORDER — OXYCODONE AND ACETAMINOPHEN 7.5; 325 MG/1; MG/1
1 TABLET ORAL ONCE AS NEEDED
Status: DISCONTINUED | OUTPATIENT
Start: 2018-11-19 | End: 2018-11-19 | Stop reason: HOSPADM

## 2018-11-19 RX ORDER — PROMETHAZINE HYDROCHLORIDE 25 MG/1
25 SUPPOSITORY RECTAL ONCE AS NEEDED
Status: DISCONTINUED | OUTPATIENT
Start: 2018-11-19 | End: 2018-11-19 | Stop reason: HOSPADM

## 2018-11-19 RX ORDER — MIDAZOLAM HYDROCHLORIDE 1 MG/ML
2 INJECTION INTRAMUSCULAR; INTRAVENOUS
Status: DISCONTINUED | OUTPATIENT
Start: 2018-11-19 | End: 2018-11-19 | Stop reason: HOSPADM

## 2018-11-19 RX ORDER — NALOXONE HCL 0.4 MG/ML
0.2 VIAL (ML) INJECTION AS NEEDED
Status: DISCONTINUED | OUTPATIENT
Start: 2018-11-19 | End: 2018-11-19 | Stop reason: HOSPADM

## 2018-11-19 RX ORDER — OXYCODONE HCL 10 MG/1
20 TABLET, FILM COATED, EXTENDED RELEASE ORAL ONCE
Status: COMPLETED | OUTPATIENT
Start: 2018-11-19 | End: 2018-11-19

## 2018-11-19 RX ORDER — FUROSEMIDE 10 MG/ML
INJECTION INTRAMUSCULAR; INTRAVENOUS
Status: COMPLETED
Start: 2018-11-19 | End: 2018-11-19

## 2018-11-19 RX ORDER — CEFAZOLIN SODIUM 2 G/100ML
2 INJECTION, SOLUTION INTRAVENOUS ONCE
Status: COMPLETED | OUTPATIENT
Start: 2018-11-19 | End: 2018-11-19

## 2018-11-19 RX ORDER — IPRATROPIUM BROMIDE AND ALBUTEROL SULFATE 2.5; .5 MG/3ML; MG/3ML
3 SOLUTION RESPIRATORY (INHALATION) ONCE AS NEEDED
Status: COMPLETED | OUTPATIENT
Start: 2018-11-19 | End: 2018-11-19

## 2018-11-19 RX ORDER — FLUMAZENIL 0.1 MG/ML
0.2 INJECTION INTRAVENOUS AS NEEDED
Status: DISCONTINUED | OUTPATIENT
Start: 2018-11-19 | End: 2018-11-19 | Stop reason: HOSPADM

## 2018-11-19 RX ORDER — ONDANSETRON 4 MG/1
4 TABLET, FILM COATED ORAL EVERY 6 HOURS PRN
Status: DISCONTINUED | OUTPATIENT
Start: 2018-11-19 | End: 2018-11-21 | Stop reason: HOSPADM

## 2018-11-19 RX ORDER — HYDROMORPHONE HYDROCHLORIDE 1 MG/ML
0.5 INJECTION, SOLUTION INTRAMUSCULAR; INTRAVENOUS; SUBCUTANEOUS
Status: DISCONTINUED | OUTPATIENT
Start: 2018-11-19 | End: 2018-11-21 | Stop reason: HOSPADM

## 2018-11-19 RX ORDER — SUCCINYLCHOLINE CHLORIDE 20 MG/ML
INJECTION INTRAMUSCULAR; INTRAVENOUS AS NEEDED
Status: DISCONTINUED | OUTPATIENT
Start: 2018-11-19 | End: 2018-11-19 | Stop reason: SURG

## 2018-11-19 RX ADMIN — FENTANYL CITRATE 50 MCG: 50 INJECTION INTRAMUSCULAR; INTRAVENOUS at 09:12

## 2018-11-19 RX ADMIN — PROPOFOL 160 MG: 10 INJECTION, EMULSION INTRAVENOUS at 08:04

## 2018-11-19 RX ADMIN — PROPOFOL 50 MG: 10 INJECTION, EMULSION INTRAVENOUS at 13:27

## 2018-11-19 RX ADMIN — SODIUM CHLORIDE, POTASSIUM CHLORIDE, SODIUM LACTATE AND CALCIUM CHLORIDE: 600; 310; 30; 20 INJECTION, SOLUTION INTRAVENOUS at 07:57

## 2018-11-19 RX ADMIN — HYDROMORPHONE HYDROCHLORIDE 0.5 MG: 1 INJECTION, SOLUTION INTRAMUSCULAR; INTRAVENOUS; SUBCUTANEOUS at 22:18

## 2018-11-19 RX ADMIN — DEXAMETHASONE SODIUM PHOSPHATE 6 MG: 10 INJECTION INTRAMUSCULAR; INTRAVENOUS at 08:29

## 2018-11-19 RX ADMIN — ONDANSETRON 4 MG: 2 INJECTION INTRAMUSCULAR; INTRAVENOUS at 18:47

## 2018-11-19 RX ADMIN — ROCURONIUM BROMIDE 20 MG: 10 INJECTION INTRAVENOUS at 11:17

## 2018-11-19 RX ADMIN — HYDROMORPHONE HYDROCHLORIDE 0.5 MG: 1 INJECTION, SOLUTION INTRAMUSCULAR; INTRAVENOUS; SUBCUTANEOUS at 14:38

## 2018-11-19 RX ADMIN — IPRATROPIUM BROMIDE AND ALBUTEROL SULFATE 3 ML: .5; 3 SOLUTION RESPIRATORY (INHALATION) at 14:17

## 2018-11-19 RX ADMIN — ROCURONIUM BROMIDE 50 MG: 10 INJECTION INTRAVENOUS at 08:04

## 2018-11-19 RX ADMIN — SODIUM CHLORIDE, POTASSIUM CHLORIDE, SODIUM LACTATE AND CALCIUM CHLORIDE 9 ML/HR: 600; 310; 30; 20 INJECTION, SOLUTION INTRAVENOUS at 07:26

## 2018-11-19 RX ADMIN — DEXAMETHASONE SODIUM PHOSPHATE 8 MG: 10 INJECTION INTRAMUSCULAR; INTRAVENOUS at 13:46

## 2018-11-19 RX ADMIN — SODIUM CHLORIDE, POTASSIUM CHLORIDE, SODIUM LACTATE AND CALCIUM CHLORIDE: 600; 310; 30; 20 INJECTION, SOLUTION INTRAVENOUS at 11:55

## 2018-11-19 RX ADMIN — CEFAZOLIN SODIUM 2 G: 2 INJECTION, SOLUTION INTRAVENOUS at 08:18

## 2018-11-19 RX ADMIN — LIDOCAINE HYDROCHLORIDE 60 MG: 20 INJECTION, SOLUTION INFILTRATION; PERINEURAL at 08:04

## 2018-11-19 RX ADMIN — HYDROMORPHONE HYDROCHLORIDE 0.5 MG: 2 INJECTION INTRAMUSCULAR; INTRAVENOUS; SUBCUTANEOUS at 11:10

## 2018-11-19 RX ADMIN — HYDROMORPHONE HYDROCHLORIDE 0.5 MG: 1 INJECTION, SOLUTION INTRAMUSCULAR; INTRAVENOUS; SUBCUTANEOUS at 18:55

## 2018-11-19 RX ADMIN — FENTANYL CITRATE 100 MCG: 50 INJECTION INTRAMUSCULAR; INTRAVENOUS at 08:02

## 2018-11-19 RX ADMIN — FENTANYL CITRATE 100 MCG: 50 INJECTION INTRAMUSCULAR; INTRAVENOUS at 08:40

## 2018-11-19 RX ADMIN — FENTANYL CITRATE 50 MCG: 50 INJECTION INTRAMUSCULAR; INTRAVENOUS at 12:55

## 2018-11-19 RX ADMIN — SODIUM CHLORIDE, POTASSIUM CHLORIDE, SODIUM LACTATE AND CALCIUM CHLORIDE: 600; 310; 30; 20 INJECTION, SOLUTION INTRAVENOUS at 13:22

## 2018-11-19 RX ADMIN — FENTANYL CITRATE 50 MCG: 50 INJECTION, SOLUTION INTRAMUSCULAR; INTRAVENOUS at 16:49

## 2018-11-19 RX ADMIN — ONDANSETRON 4 MG: 2 INJECTION INTRAMUSCULAR; INTRAVENOUS at 10:58

## 2018-11-19 RX ADMIN — FUROSEMIDE 20 MG: 10 INJECTION INTRAMUSCULAR; INTRAVENOUS at 16:49

## 2018-11-19 RX ADMIN — ALBUTEROL SULFATE 2.5 MG: 2.5 SOLUTION RESPIRATORY (INHALATION) at 17:17

## 2018-11-19 RX ADMIN — PROMETHAZINE HYDROCHLORIDE 12.5 MG: 25 INJECTION INTRAMUSCULAR; INTRAVENOUS at 22:24

## 2018-11-19 RX ADMIN — LABETALOL HYDROCHLORIDE 10 MG: 5 INJECTION, SOLUTION INTRAVENOUS at 11:17

## 2018-11-19 RX ADMIN — CELECOXIB 400 MG: 200 CAPSULE ORAL at 07:41

## 2018-11-19 RX ADMIN — SUGAMMADEX 200 MG: 100 INJECTION, SOLUTION INTRAVENOUS at 13:14

## 2018-11-19 RX ADMIN — FAMOTIDINE 20 MG: 10 INJECTION, SOLUTION INTRAVENOUS at 07:26

## 2018-11-19 RX ADMIN — FUROSEMIDE 40 MG: 10 INJECTION, SOLUTION INTRAMUSCULAR; INTRAVENOUS at 20:37

## 2018-11-19 RX ADMIN — MIDAZOLAM 1 MG: 1 INJECTION INTRAMUSCULAR; INTRAVENOUS at 07:41

## 2018-11-19 RX ADMIN — FENTANYL CITRATE 50 MCG: 50 INJECTION, SOLUTION INTRAMUSCULAR; INTRAVENOUS at 14:25

## 2018-11-19 RX ADMIN — HYDROMORPHONE HYDROCHLORIDE 0.5 MG: 2 INJECTION INTRAMUSCULAR; INTRAVENOUS; SUBCUTANEOUS at 10:59

## 2018-11-19 RX ADMIN — SUCCINYLCHOLINE CHLORIDE 30 MG: 20 INJECTION, SOLUTION INTRAMUSCULAR; INTRAVENOUS; PARENTERAL at 13:29

## 2018-11-19 RX ADMIN — OXYCODONE HYDROCHLORIDE 20 MG: 10 TABLET, FILM COATED, EXTENDED RELEASE ORAL at 07:40

## 2018-11-19 RX ADMIN — FUROSEMIDE 20 MG: 10 INJECTION, SOLUTION INTRAMUSCULAR; INTRAVENOUS at 16:49

## 2018-11-19 NOTE — ANESTHESIA PROCEDURE NOTES
ANESTHESIA INTUBATION  Urgency: elective    Date/Time: 11/19/2018 8:07 AM  Airway not difficult    General Information and Staff    Patient location during procedure: OR  Anesthesiologist: Mina Greer MD  CRNA: Soraya Olmos CRNA    Indications and Patient Condition  Indications for airway management: airway protection    Preoxygenated: yes  Mask difficulty assessment: 1 - vent by mask    Final Airway Details  Final airway type: endotracheal airway      Successful airway: ETT  Cuffed: yes   Successful intubation technique: direct laryngoscopy  Endotracheal tube insertion site: oral  Blade: Sumanth  Blade size: 3  ETT size (mm): 7.0  Cormack-Lehane Classification: grade I - full view of glottis  Placement verified by: chest auscultation and capnometry   Measured from: lips  ETT to lips (cm): 20  Number of attempts at approach: 1    Additional Comments  Smooth IV induction. Trachea intubated. Cuff up. Ett secured. BEBS. Dentition intact without injury.

## 2018-11-19 NOTE — PROGRESS NOTES
S/p uncomplicated LSH today. Had episode of laryngospasm with extubation but did ok.    Still in PACU due to O2 sats low 90% despite oxygen...... Had significant amt of mucus that coughed up and vomited up and feels better. Feels wheezy and sob.    Appears labored breathing.     Lungs- bilateral ronchi    Checking cbc/cmp    Giving 20mg iv lasix.    U/o good.      Consulting pulmonary to help with pulmonary care.    Spoke with family to inform why still in pacu and that will need to go to monitored bed instead of [facility].    Family inform me recent hospitalization for chest pain. Told her oxygen dropped when sleeping so needed evaluation. Said this was lupus flare. Had not had issues for years.

## 2018-11-19 NOTE — ANESTHESIA PREPROCEDURE EVALUATION
Anesthesia Evaluation     Patient summary reviewed and Nursing notes reviewed   NPO Solid Status: > 8 hours  NPO Liquid Status: > 8 hours           Airway   Mallampati: III  Neck ROM: full  Possible difficult intubation  Dental - normal exam     Pulmonary     breath sounds clear to auscultation  Cardiovascular     Rhythm: regular        Neuro/Psych  GI/Hepatic/Renal/Endo    (+) obesity, morbid obesity,      Musculoskeletal     Abdominal   (+) obese,    Substance History      OB/GYN          Other      history of cancer                    Anesthesia Plan    ASA 3     general     intravenous induction   Anesthetic plan, all risks, benefits, and alternatives have been provided, discussed and informed consent has been obtained with: patient.

## 2018-11-19 NOTE — OP NOTE
Subjective     Patient Name: Jaskaran Mckay  :  1975  MRN:  [id number]      Date of Service:  18    Surgeon:  Assistant: MD Butch Toledo MD         Pre-operative diagnosis(es): Menorrhagia  Anemia  Enlarged Uterus     Post-operative diagnosis(es): Post-Op Diagnosis Codes:   sme       Procedure(s): Procedure(s):  LAPAROSCOPIC SUPRACERVICAL HYSTERECTOMY WITH  BILATERAL SALPINGECTOMY WITH DAVINCI ROBOT           Anesthesia: Type: General       Objective          Specimens removed:   Order Name Source Comment Collection Info Order Time   TISSUE PATHOLOGY EXAM Uterus with Fallopian Tubes  Collected By: Mirtha Martin MD 2018  1:02 PM          EBL: 250 mL     Antibiotics:                cefazolin (Ancef) ordered on call to OR               Complications:      Assessment/Plan         Operative Findings: Normal cervix. Uterus enlarged to 14 wk size on exam under anesthesia. Laparoscopy- uterus enlarged and boggy 400g, normal ovaries bilaterally, normal right tube and dilated left tube with fluid, normal bowel. Normal appendix, normal liver edge       Indication for surgery: Menorrhagia failed conservative treatments, enlarged uterus, anemia       Description of Procedure: She was taken to the operating room where she was placed under general anesthesia.  She was placed in the dorsal lithotomy position with her knees flexed and avoiding lateral pressure on her calves.  Her arms were loosely tucked at her sides.  Exam under anesthesia revealed an enlarged uterus to about 14 weeks size that was very bulky.  She was prepped and draped.  Santana catheter was placed.  A paracervical block was placed using 20 mL of 1% lidocaine local.  She was gently sounded and sounded to a good 10 cm.  A DELORES uterine manipulator was placed using a 10 cm tip and a medium-sized KOH ring.  This was placed for uterine manipulation.  Attention was turned to the abdomen.  All port sites were injected  preemptively with the same mixture of local.  A small infraumbilical skin incision was made with a scalpel.  A Veress needle was placed with intraabdominal placement and confirmed with low intra abdominal pressure.  Pneumoperitoneum was obtained.  A 12 mm Ethicon non-bladed Optiview trocar was placed under direct visualization using the laparoscope and intraabdominal placement was confirmed.  Two lateral 8 mm da Pam ports were placed at the level of the umbilicus under direct visualization without difficulty.  A right upper quadrant 11 mm non-bladed Ethicon Optiview trocar was placed under direct visualization.  The robot was then docked.  The uterus was somewhat immobile and did not elevate.  The robot had been docked and starting with the PK in the left hand and the monopolar scissors in the right hand, the hysterectomy was begun.  I could not easily get to the tubes; therefore, I just came down the left round/utero-ovarian ligament using the PK to cauterize and the scissors to cut.  I came on down the left utero-ovarian and broad ligament getting down to the level of the uterine vessels on the left.  I started the planes to dissect the bladder off of the anterior cervix.                               Attention was turned to the right side and the right round ligament was identified and taken with the PK and cut with monopolar scissors.  I came on down the right utero-ovarian and broad ligament.  I came on down to the level of the vessels and the anterior bladder incision was made to meet the left side.  The bladder pillars and the bladder plane were very vascular and were somewhat high on the cervix and lower uterine segment.  This was dissected down.  There was significant vasculature in the bladder pillars.  The vessels were taken on the right side, and then the left side using the PK to take the vessels and then come on down the cardinal ligaments just a little bit to help the vascular bundle fall away  laterally.  This was done similarly on the left side.  The tubes had not been easily accessible, but now were somewhat in the way and the patient's left tube was then removed using the PK to cauterize and the laparoscopic scissors to cut. The left tube was distended with fluid and the fluid was drained to allow to pass through the ports to be removed.  Patient's right tube was excised in a similar fashion and both ovaries remained intact.  There was very thickened tissue in the anterior cervix, and the uterus was boggy, difficult to elevate and was very immobile.  It was also very vascular.  I was down to the cervix, but could not get all the way down easily to the vagina.  She was having pumper-vessel, vascular bleeding trying to get down to this area.  At this time, the decision was made to convert this to a supracervical hysterectomy and leave the small remaining end of the cervix as it would create less risk of injury to the bladder and less bleeding.  The monopolar scissors were used to just come across the cervical stump and the specimen was removed.  The pedicles were then hemostatic.  I cauterized the endocervical canal and the pelvis was irrigated.  At this time, I undocked the robot and proceeded to remove the specimen.  The fascia was extended in the umbilical incision to place the Luca bag collection system to morcellate in the bag.  There was no power morcellator at all used.  The specimen was placed in the bag and brought out through the umbilical incision and proceeded then to continue to morcellate piece by piece this enlarged uterus.  Once the specimen was completely removed, the Luca retractor system was removed.  Another look in the pelvis revealed hemostasis and the fascia in the umbilical incision was closed with 0 Vicryl.  Anterior abd wall was free.  Cystoscopy was performed and the bladder appeared to be intact, and both ureters were visualized to freely jet urine.  The skin was closed  with 4-0 Monocryl as well as Dermabond glue.  The patient was brought from anesthesia and during waking up from anesthesia, she did have a laryngospasm episode before she was taken to the recovery room that resolved without reintubation.                                                            Mirtha Martin MD  11/19/18  4:51 PM

## 2018-11-19 NOTE — CONSULTS
Pulmonary Consultation     Patient Name: Jaskaran Mckay  Age/Sex: 42 y.o. female  : 1975  MRN: [id number]    Date of Admission: 2018  Date of Encounter Visit: 18  Encounter Provider: Niki Sims MD  Referring Provider: Mirtha Martin MD  Place of Service: [facility]  Patient Care Team:  Tanner Boyle MD as PCP - General (Internal Medicine)  Mirtha Martin MD as Consulting Physician (Obstetrics and Gynecology)  Robert York MD as Consulting Physician (Hematology and Oncology)  Mirtha Martin MD as Referring Physician (Obstetrics and Gynecology)      Subjective:     Consulted for: post op respiratory distress    Chief Complaint: cough and wheezing    History of Present Illness:  Jaskaran Mckay is a 42 y.o. female with history of previous bronchitis attacks requiring albuterol treatment, but no history of asthma, or COPD, no smoking, no CAD.  She does have menstrual bleed and was followed by GYN and just had a hysterectomy under general anesthesia. Post op she was doing well. She did get extubated and post that, she did have a large burp that caused all the mucous in her esophagus to regurgitate. She did spit most of it, she was awake at the time, but she did develop bronchial spasm and was wheezing loudly and requiring supplemental oxygen. She was examined by the provider at the time and suspected to be on the wet side and had a dose of lasix 20 mg, she did get one treatment with duoneb.  I was called by Dr Martin and patient was examined with signs of improvement already. The labs and films were reviewed.   Discussed with the family  No history of previous similar problems with anesthesia  She was fasting prior to the procedure, she did not throw up vomitus, this was mainly secretion and mucous with some blood from the intubation attempt.  Patient was still drowsy but able to answer simple question the history was also obtained from the family.    Pulmonary Functions Testing  Results:    No results found for: FEV1, FVC, VBD8KVU, TLC, DLCO    Review of Systems:   Review of Systems   Constitutional: Negative.    HENT: Negative.    Eyes: Negative.    Respiratory: Negative.    Cardiovascular: Negative.    Gastrointestinal: Negative.  Vomiting: LSAA.   Endocrine: Negative.    Genitourinary: Positive for menstrual problem and vaginal bleeding.   Musculoskeletal: Negative.    Allergic/Immunologic: Negative.    Neurological: Negative.    Psychiatric/Behavioral: Negative.      Past Medical History:  Past Medical History:   Diagnosis Date   • Abnormal Pap smear of cervix    • Adenomyosis    • Anemia    • Diverticulitis    • History of blood transfusion     SEVERAL D/T ANEMIA    • History of iron deficiency anemia    • History of snoring    • Lupus    • Menorrhagia    • Uterine fibroid        Past Surgical History:   Procedure Laterality Date   • ACHILLES TENDON REPAIR Right    • EYE MUSCLE SURGERY Right    • WISDOM TOOTH EXTRACTION         Home Medications:   Medications Prior to Admission   Medication Sig Dispense Refill Last Dose   • fluticasone (FLONASE) 50 MCG/ACT nasal spray 2 sprays into the nostril(s) as directed by provider Daily As Needed for Allergies.   Past Week at Unknown time   • naproxen (NAPROSYN) 500 MG tablet Take 500 mg by mouth As Needed for Mild Pain  (PRN EVERY 8-12 HOURS ABDOMINAL CRAMPING).   Past Week at Unknown time       Inpatient Medications:  Scheduled Meds:   Continuous Infusions:  lactated ringers 9 mL/hr Last Rate: 9 mL/hr (11/19/18 0726)     PRN Meds:.•  ePHEDrine  •  fentanyl  •  flumazenil  •  HYDROcodone-acetaminophen  •  HYDROmorphone  •  labetalol  •  meperidine  •  naloxone  •  ondansetron  •  oxyCODONE-acetaminophen  •  promethazine **OR** promethazine **OR** promethazine **OR** promethazine  •  promethazine    Allergies:  Allergies   Allergen Reactions   • Penicillins Rash     Childhood allergy, has taken cefazolin in past       Past Social History:  Social  History     Socioeconomic History   • Marital status: Single     Spouse name: Not on file   • Number of children: 0   • Years of education: College   • Highest education level: Not on file   Occupational History   • Occupation: [employer]     Employer: [employer]   Tobacco Use   • Smoking status: Never Smoker   • Smokeless tobacco: Never Used   Substance and Sexual Activity   • Alcohol use: Yes     Comment: Occasional   • Drug use: No   • Sexual activity: Defer       Past Family History:  Family History   Problem Relation Age of Onset   • Breast cancer Maternal Aunt    • Mental illness Maternal Aunt    • Hypertension Maternal Aunt    • Mental illness Paternal Aunt    • Cancer Maternal Grandmother    • Hypertension Maternal Grandmother    • Cancer Cousin    • Allergy (severe) Father    • Allergy (severe) Sister    • Malig Hyperthermia Neg Hx            Objective:   Temp:  [98.3 °F (36.8 °C)-98.4 °F (36.9 °C)] 98.4 °F (36.9 °C)  Heart Rate:  [] 106  Resp:  [16-18] 16  BP: (140-166)/(81-97) 147/81  SpO2:  [90 %-100 %] 93 %  on  Flow (L/min):  [4-6] 4 Device (Oxygen Therapy): face tent;nasal cannula     Intake/Output Summary (Last 24 hours) at 11/19/2018 1705  Last data filed at 11/19/2018 1405  Gross per 24 hour   Intake 2900 ml   Output 500 ml   Net 2400 ml     Body mass index is 41.55 kg/m².      11/19/18  0657   Weight: 106 kg (234 lb 9 oz)     Weight change:     Physical Exam:   Physical Exam   General:    No acute distress, drowsy but arousable oriented x4, coughing                   Head:    Normocephalic, atraumatic.   Eyes:          Conjunctivae and sclerae normal, no icterus, PERRLA   Throat:   No oral lesions, no thrush, oral mucosa moist. Mallampati IV   Neck:   Supple, trachea midline.   Lungs:     Normal chest on inspection, positive expiratory rhonchies and wheezing, she did have coarse crackles that did improve with deep breathing.      Heart:    Regular rhythm and normal rate.  No  murmurs, gallops, or rubs noted.   Abdomen:     Soft, non-tender, non-distended, hypoactive bowel sounds.    Extremities:   No clubbing, cyanosis, or edema.     Pulses:   Pulses palpable and equal bilaterally.    Skin:   No bleeding or rash.   Neuro:   Non-focal.  Moves all extremities well.    Psychiatric:   drwosy.     Lab Review:   Results from last 7 days   Lab Units  11/14/18   1023   SODIUM mmol/L  135*   POTASSIUM mmol/L  3.7   CHLORIDE mmol/L  101   CO2 mmol/L  24.1   BUN mg/dL  8   CREATININE mg/dL  0.73   GLUCOSE mg/dL  95   CALCIUM mg/dL  9.4         Results from last 7 days   Lab Units  11/14/18   1023   WBC 10*3/mm3  5.26   HEMOGLOBIN g/dL  12.2   HEMATOCRIT %  37.3   PLATELETS 10*3/mm3  325   MCV fL  93.7   MCH pg  30.7   MCHC g/dL  32.7   RDW %  12.7   RDW-SD fl  43.7   MPV fL  10.2   NEUTROPHIL % %  63.5   LYMPHOCYTE % %  27.0   MONOCYTES % %  7.6   EOSINOPHIL % %  1.3   BASOPHIL % %  0.6   IMM GRAN % %  0.4   NEUTROS ABS 10*3/mm3  3.34   LYMPHS ABS 10*3/mm3  1.42   MONOS ABS 10*3/mm3  0.40   EOS ABS 10*3/mm3  0.07   BASOS ABS 10*3/mm3  0.03   IMMATURE GRANS (ABS) 10*3/mm3  0.02                   Invalid input(s): LDLCALC                                              Imaging:  Imaging Results (most recent)     None          I personally viewed and interpreted the patient's imaging studies: bilateral lower lobes infiltrates, right worse than left, likely pulmonary edema..    Assessment:   1. Acute post op hypoxemic respiratory failure  2. Post op pulmonary edema  3. Possible aspiration  4. Acute bronchospasm  5. menorhagia      Plan:     After reviewed the CXR, the case was dicussed again with Dr Martin, will admit to a telemetry unit and will check another CXR in am, will diurese and will check Echo. If the echocardiogram is much better in ma, that will be consistent with transient pulmonary edema, will follow and manage accordingly  No need for antibiotics for aspiration pneumonia at this point  Will  hold on the steroids but will give in case of worsening respiratory failure  Hold on IV hydration  Continue with albuterol prn  Ok to feed if respiratory status is not getting worse  Will check pro bnp in am        Thank you for allowing me to participate in the care of Jaskaran Mckay. Feel free to contact me directly with any further questions or concerns.    Niki Sims MD  [facility]   11/19/18  5:05 PM    Dictated utilizing Dragon dictation

## 2018-11-20 ENCOUNTER — APPOINTMENT (OUTPATIENT)
Dept: GENERAL RADIOLOGY | Facility: HOSPITAL | Age: 43
End: 2018-11-20

## 2018-11-20 ENCOUNTER — APPOINTMENT (OUTPATIENT)
Dept: CARDIOLOGY | Facility: HOSPITAL | Age: 43
End: 2018-11-20
Attending: INTERNAL MEDICINE

## 2018-11-20 LAB
ANION GAP SERPL CALCULATED.3IONS-SCNC: 14 MMOL/L
AORTIC DIMENSIONLESS INDEX: 0.7 (DI)
BH CV ECHO MEAS - ACS: 1.5 CM
BH CV ECHO MEAS - AO MAX PG (FULL): 7.7 MMHG
BH CV ECHO MEAS - AO MAX PG: 13.2 MMHG
BH CV ECHO MEAS - AO MEAN PG (FULL): 4 MMHG
BH CV ECHO MEAS - AO MEAN PG: 7 MMHG
BH CV ECHO MEAS - AO ROOT AREA (BSA CORRECTED): 1.5
BH CV ECHO MEAS - AO ROOT AREA: 7.1 CM^2
BH CV ECHO MEAS - AO ROOT DIAM: 3 CM
BH CV ECHO MEAS - AO V2 MAX: 182 CM/SEC
BH CV ECHO MEAS - AO V2 MEAN: 124 CM/SEC
BH CV ECHO MEAS - AO V2 VTI: 34 CM
BH CV ECHO MEAS - AVA(I,A): 1.8 CM^2
BH CV ECHO MEAS - AVA(I,D): 1.8 CM^2
BH CV ECHO MEAS - AVA(V,A): 1.6 CM^2
BH CV ECHO MEAS - AVA(V,D): 1.6 CM^2
BH CV ECHO MEAS - BSA(HAYCOCK): 2.2 M^2
BH CV ECHO MEAS - BSA: 2.1 M^2
BH CV ECHO MEAS - BZI_BMI: 41.5 KILOGRAMS/M^2
BH CV ECHO MEAS - BZI_METRIC_HEIGHT: 160 CM
BH CV ECHO MEAS - BZI_METRIC_WEIGHT: 106.1 KG
BH CV ECHO MEAS - EDV(CUBED): 85.2 ML
BH CV ECHO MEAS - EDV(MOD-SP2): 90 ML
BH CV ECHO MEAS - EDV(MOD-SP4): 89 ML
BH CV ECHO MEAS - EDV(TEICH): 87.7 ML
BH CV ECHO MEAS - EF(CUBED): 74.2 %
BH CV ECHO MEAS - EF(MOD-BP): 64 %
BH CV ECHO MEAS - EF(MOD-SP2): 65.6 %
BH CV ECHO MEAS - EF(MOD-SP4): 64 %
BH CV ECHO MEAS - EF(TEICH): 66.3 %
BH CV ECHO MEAS - ESV(CUBED): 22 ML
BH CV ECHO MEAS - ESV(MOD-SP2): 31 ML
BH CV ECHO MEAS - ESV(MOD-SP4): 32 ML
BH CV ECHO MEAS - ESV(TEICH): 29.6 ML
BH CV ECHO MEAS - FS: 36.4 %
BH CV ECHO MEAS - IVS/LVPW: 1
BH CV ECHO MEAS - IVSD: 0.9 CM
BH CV ECHO MEAS - LAT PEAK E' VEL: 15 CM/SEC
BH CV ECHO MEAS - LV DIASTOLIC VOL/BSA (35-75): 43.1 ML/M^2
BH CV ECHO MEAS - LV MASS(C)D: 128 GRAMS
BH CV ECHO MEAS - LV MASS(C)DI: 61.9 GRAMS/M^2
BH CV ECHO MEAS - LV MAX PG: 5.6 MMHG
BH CV ECHO MEAS - LV MEAN PG: 3 MMHG
BH CV ECHO MEAS - LV SYSTOLIC VOL/BSA (12-30): 15.5 ML/M^2
BH CV ECHO MEAS - LV V1 MAX: 118 CM/SEC
BH CV ECHO MEAS - LV V1 MEAN: 81.1 CM/SEC
BH CV ECHO MEAS - LV V1 VTI: 23.9 CM
BH CV ECHO MEAS - LVIDD: 4.4 CM
BH CV ECHO MEAS - LVIDS: 2.8 CM
BH CV ECHO MEAS - LVLD AP2: 7.8 CM
BH CV ECHO MEAS - LVLD AP4: 7.9 CM
BH CV ECHO MEAS - LVLS AP2: 6.6 CM
BH CV ECHO MEAS - LVLS AP4: 6.2 CM
BH CV ECHO MEAS - LVOT AREA (M): 2.5 CM^2
BH CV ECHO MEAS - LVOT AREA: 2.5 CM^2
BH CV ECHO MEAS - LVOT DIAM: 1.8 CM
BH CV ECHO MEAS - LVPWD: 0.9 CM
BH CV ECHO MEAS - MED PEAK E' VEL: 11 CM/SEC
BH CV ECHO MEAS - MV A DUR: 0.19 SEC
BH CV ECHO MEAS - MV A MAX VEL: 87.3 CM/SEC
BH CV ECHO MEAS - MV DEC SLOPE: 397.5 CM/SEC^2
BH CV ECHO MEAS - MV DEC TIME: 0.21 SEC
BH CV ECHO MEAS - MV E MAX VEL: 85.9 CM/SEC
BH CV ECHO MEAS - MV E/A: 0.98
BH CV ECHO MEAS - MV MAX PG: 3.8 MMHG
BH CV ECHO MEAS - MV MEAN PG: 2 MMHG
BH CV ECHO MEAS - MV P1/2T MAX VEL: 90.7 CM/SEC
BH CV ECHO MEAS - MV P1/2T: 66.8 MSEC
BH CV ECHO MEAS - MV V2 MAX: 98.1 CM/SEC
BH CV ECHO MEAS - MV V2 MEAN: 64.6 CM/SEC
BH CV ECHO MEAS - MV V2 VTI: 27.8 CM
BH CV ECHO MEAS - MVA P1/2T LCG: 2.4 CM^2
BH CV ECHO MEAS - MVA(P1/2T): 3.3 CM^2
BH CV ECHO MEAS - MVA(VTI): 2.2 CM^2
BH CV ECHO MEAS - PA ACC TIME: 0.1 SEC
BH CV ECHO MEAS - PA MAX PG (FULL): 4.6 MMHG
BH CV ECHO MEAS - PA MAX PG: 5.9 MMHG
BH CV ECHO MEAS - PA PR(ACCEL): 34.5 MMHG
BH CV ECHO MEAS - PA V2 MAX: 121 CM/SEC
BH CV ECHO MEAS - PULM A REVS DUR: 0.17 SEC
BH CV ECHO MEAS - PULM A REVS VEL: 40.7 CM/SEC
BH CV ECHO MEAS - PULM DIAS VEL: 52.9 CM/SEC
BH CV ECHO MEAS - PULM S/D: 1.4
BH CV ECHO MEAS - PULM SYS VEL: 72.3 CM/SEC
BH CV ECHO MEAS - PVA(V,A): 1.5 CM^2
BH CV ECHO MEAS - PVA(V,D): 1.5 CM^2
BH CV ECHO MEAS - QP/QS: 0.67
BH CV ECHO MEAS - RV MAX PG: 1.3 MMHG
BH CV ECHO MEAS - RV MEAN PG: 1 MMHG
BH CV ECHO MEAS - RV V1 MAX: 56.9 CM/SEC
BH CV ECHO MEAS - RV V1 MEAN: 37.2 CM/SEC
BH CV ECHO MEAS - RV V1 VTI: 13 CM
BH CV ECHO MEAS - RVOT AREA: 3.1 CM^2
BH CV ECHO MEAS - RVOT DIAM: 2 CM
BH CV ECHO MEAS - SI(AO): 116.3 ML/M^2
BH CV ECHO MEAS - SI(CUBED): 30.6 ML/M^2
BH CV ECHO MEAS - SI(LVOT): 29.4 ML/M^2
BH CV ECHO MEAS - SI(MOD-SP2): 28.5 ML/M^2
BH CV ECHO MEAS - SI(MOD-SP4): 27.6 ML/M^2
BH CV ECHO MEAS - SI(TEICH): 28.1 ML/M^2
BH CV ECHO MEAS - SV(AO): 240.3 ML
BH CV ECHO MEAS - SV(CUBED): 63.2 ML
BH CV ECHO MEAS - SV(LVOT): 60.8 ML
BH CV ECHO MEAS - SV(MOD-SP2): 59 ML
BH CV ECHO MEAS - SV(MOD-SP4): 57 ML
BH CV ECHO MEAS - SV(RVOT): 40.8 ML
BH CV ECHO MEAS - SV(TEICH): 58.1 ML
BH CV ECHO MEAS - TAPSE (>1.6): 2.6 CM2
BH CV ECHO MEASUREMENTS AVERAGE E/E' RATIO: 6.61
BH CV VAS BP RIGHT ARM: NORMAL MMHG
BH CV XLRA - RV BASE: 3.2 CM
BH CV XLRA - TDI S': 15 CM/SEC
BUN BLD-MCNC: 8 MG/DL (ref 6–20)
BUN/CREAT SERPL: 9.2 (ref 7–25)
CALCIUM SPEC-SCNC: 8.8 MG/DL (ref 8.6–10.5)
CHLORIDE SERPL-SCNC: 96 MMOL/L (ref 98–107)
CO2 SERPL-SCNC: 25 MMOL/L (ref 22–29)
CREAT BLD-MCNC: 0.87 MG/DL (ref 0.57–1)
CYTO UR: NORMAL
DEPRECATED RDW RBC AUTO: 44 FL (ref 37–54)
ERYTHROCYTE [DISTWIDTH] IN BLOOD BY AUTOMATED COUNT: 12.8 % (ref 11.7–13)
GFR SERPL CREATININE-BSD FRML MDRD: 72 ML/MIN/1.73
GLUCOSE BLD-MCNC: 124 MG/DL (ref 65–99)
HCT VFR BLD AUTO: 34.6 % (ref 35.6–45.5)
HGB BLD-MCNC: 11.3 G/DL (ref 11.9–15.5)
LAB AP CASE REPORT: NORMAL
LEFT ATRIUM VOLUME INDEX: 26 ML/M2
LEFT ATRIUM VOLUME: 49 CM3
LV EF 2D ECHO EST: 64 %
MAXIMAL PREDICTED HEART RATE: 179 BPM
MCH RBC QN AUTO: 30.8 PG (ref 26.9–32)
MCHC RBC AUTO-ENTMCNC: 32.7 G/DL (ref 32.4–36.3)
MCV RBC AUTO: 94.3 FL (ref 80.5–98.2)
PATH REPORT.FINAL DX SPEC: NORMAL
PATH REPORT.GROSS SPEC: NORMAL
PLATELET # BLD AUTO: 291 10*3/MM3 (ref 140–500)
PMV BLD AUTO: 11.3 FL (ref 6–12)
POTASSIUM BLD-SCNC: 4.2 MMOL/L (ref 3.5–5.2)
RBC # BLD AUTO: 3.67 10*6/MM3 (ref 3.9–5.2)
SODIUM BLD-SCNC: 135 MMOL/L (ref 136–145)
STRESS TARGET HR: 152 BPM
WBC NRBC COR # BLD: 13.98 10*3/MM3 (ref 4.5–10.7)

## 2018-11-20 PROCEDURE — 93306 TTE W/DOPPLER COMPLETE: CPT

## 2018-11-20 PROCEDURE — 85027 COMPLETE CBC AUTOMATED: CPT | Performed by: OBSTETRICS & GYNECOLOGY

## 2018-11-20 PROCEDURE — 71045 X-RAY EXAM CHEST 1 VIEW: CPT

## 2018-11-20 PROCEDURE — 80048 BASIC METABOLIC PNL TOTAL CA: CPT | Performed by: INTERNAL MEDICINE

## 2018-11-20 PROCEDURE — 25010000002 HYDROMORPHONE PER 4 MG: Performed by: OBSTETRICS & GYNECOLOGY

## 2018-11-20 PROCEDURE — 93306 TTE W/DOPPLER COMPLETE: CPT | Performed by: INTERNAL MEDICINE

## 2018-11-20 RX ORDER — NAPROXEN 500 MG/1
500 TABLET ORAL EVERY 8 HOURS SCHEDULED
Status: DISCONTINUED | OUTPATIENT
Start: 2018-11-20 | End: 2018-11-21 | Stop reason: HOSPADM

## 2018-11-20 RX ADMIN — HYDROMORPHONE HYDROCHLORIDE 0.5 MG: 1 INJECTION, SOLUTION INTRAMUSCULAR; INTRAVENOUS; SUBCUTANEOUS at 08:46

## 2018-11-20 RX ADMIN — FAMOTIDINE 20 MG: 20 TABLET, FILM COATED ORAL at 08:43

## 2018-11-20 RX ADMIN — NAPROXEN 500 MG: 500 TABLET ORAL at 16:05

## 2018-11-20 RX ADMIN — HYDROMORPHONE HYDROCHLORIDE 0.5 MG: 1 INJECTION, SOLUTION INTRAMUSCULAR; INTRAVENOUS; SUBCUTANEOUS at 06:23

## 2018-11-20 RX ADMIN — NAPROXEN 500 MG: 500 TABLET ORAL at 22:16

## 2018-11-20 RX ADMIN — FAMOTIDINE 20 MG: 20 TABLET, FILM COATED ORAL at 20:32

## 2018-11-20 RX ADMIN — OXYCODONE AND ACETAMINOPHEN 1 TABLET: 5; 325 TABLET ORAL at 20:32

## 2018-11-20 RX ADMIN — OXYCODONE AND ACETAMINOPHEN 1 TABLET: 5; 325 TABLET ORAL at 13:32

## 2018-11-20 NOTE — PROGRESS NOTES
PROGRESS NOTE  Patient Name: Jaskaran Mckay  Age/Sex: 42 y.o. female  : 1975  MRN: [id number]    Date of Admission: 2018  Date of Encounter Visit: 18   LOS: 0 days   Patient Care Team:  Tanner Boyle MD as PCP - General (Internal Medicine)  Mirtha Martin MD as Consulting Physician (Obstetrics and Gynecology)  Robert York MD as Consulting Physician (Hematology and Oncology)  Mirtha Martin MD as Referring Physician (Obstetrics and Gynecology)    Chief Complaint: No more significant dyspnea with minimal persistent cough    Hospital course: Postoperative complication with hypoxemia, admitted for observation and further postoperative surgical care    Interval History: Patient with no previous history of congestive heart failure or pulmonary edema or recurrent pneumonias.  Had a postop hypoxemia after she regurgitated some secretions but she was awake at the time and was able to cough it out.  She was requiring too much oxygen, chest x-ray was done after the pulmonary consultation that showed what looks like pulmonary edema versus aspiration pneumonitis and she was given diuretics and was clinically improving and was able to come off the oxygen the next morning.  No history of heart failure before with echocardiogram showing preserved EF on the preliminary report, awaiting final report by cardiology    REVIEW OF SYSTEMS:   CONSTITUTIONAL: no fever or chills  CARDIOVASCULAR: No chest pain, chest pressure or chest discomfort. No palpitations or edema.   RESPIRATORY: Improved shortness of breath with minimal residual cough with clear secretions   GASTROINTESTINAL: Abdominal pain after hysterectomy but she is able to tolerate by mouth.   HEMATOLOGIC: No bleeding or bruising.     Ventilator/Non-Invasive Ventilation Settings (From admission, onward)    None            Vital Signs  Temp:  [97.3 °F (36.3 °C)-98.4 °F (36.9 °C)] 97.3 °F (36.3 °C)  Heart Rate:  [] 99  Resp:  [16-18] 16  BP:  "(119-166)/(68-99) 119/68  SpO2:  [88 %-100 %] 98 %  on  Flow (L/min):  [3-6] 3 Device (Oxygen Therapy): nasal cannula    Intake/Output Summary (Last 24 hours) at 11/20/2018 1033  Last data filed at 11/20/2018 0602  Gross per 24 hour   Intake 3150 ml   Output 3500 ml   Net -350 ml     Flowsheet Rows      First Filed Value   Admission Height  160 cm (63\") Documented at 11/19/2018 0657   Admission Weight  106 kg (234 lb 9 oz) Documented at 11/19/2018 0657        Body mass index is 41.45 kg/m².      11/19/18  0657 11/20/18  0500 11/20/18  0724   Weight: 106 kg (234 lb 9 oz) 106 kg (233 lb 11 oz) 106 kg (234 lb)       Physical Exam:  GEN:  No acute distress, alert, cooperative, well developed   EYES:   Sclera clear. No icterus. PERRL. Normal EOM  ENT:   External ears/nose normal, no oral lesions, no thrush, mucous membranes moist  NECK:  Supple, midline trachea, no JVD  LUNGS: Normal chest on inspection, no more wheezes, minimal crackles over the right base.   CV:  Regular rhythm and rate. Normal S1/S2. No murmurs, gallops, or rubs noted.  ABD:  Soft, minimal tenderness and non-distended. Normal bowel sounds. No guarding  EXT:  Moves all extremities well. No cyanosis. No redness. No edema.   Skin: dry, intact, no bleeding    Results Review:      Results from last 7 days   Lab Units  11/20/18   0555  11/19/18 2015 11/19/18   1745  11/14/18   1023   SODIUM mmol/L  135*  134*  133*  135*   POTASSIUM mmol/L  4.2  4.0  3.9  3.7   CHLORIDE mmol/L  96*  95*  96*  101   CO2 mmol/L  25.0  24.5  20.9*  24.1   BUN mg/dL  8  7  7  8   CREATININE mg/dL  0.87  0.84  0.90  0.73   CALCIUM mg/dL  8.8  8.5*  8.6  9.4   AST (SGOT) U/L   --    --   23   --    ALT (SGPT) U/L   --    --   26   --    ANION GAP mmol/L  14.0  14.5  16.1  9.9   ALBUMIN g/dL   --    --   3.80   --              Results from last 7 days   Lab Units  11/19/18 1745   PROBNP pg/mL  52.2     Results from last 7 days   Lab Units  11/20/18   0555  11/19/18   0775  " 11/14/18   1023   WBC 10*3/mm3  13.98*  13.45*  5.26   HEMOGLOBIN g/dL  11.3*  12.7  12.2   HEMATOCRIT %  34.6*  39.0  37.3   PLATELETS 10*3/mm3  291  304  325   MCV fL  94.3  94.4  93.7   NEUTROPHIL % %   --   94.1*  63.5   LYMPHOCYTE % %   --   4.8*  27.0   MONOCYTES % %   --   1.0*  7.6   EOSINOPHIL % %   --   0.0*  1.3   BASOPHIL % %   --   0.1  0.6   IMM GRAN % %   --   0.2  0.4                   Invalid input(s): LDLCALC          No results found for: POCGLU                        Imaging:   Imaging Results (all)     Procedure Component Value Units Date/Time    XR Chest 1 View [[id number]] Collected:  11/20/18 0640     Updated:  11/20/18 0641    Narrative:       PORTABLE CHEST X-RAY     CLINICAL HISTORY: resp failure; N92.0-Excessive and frequent  menstruation with regular cycle     COMPARISON: 11/19/2018.     FINDINGS: Portable AP view of the chest was obtained with overlying  monitor leads in place. Lungs are fairly well inflated. There has been  fairly significant improvement in perihilar and basilar predominant  airspace opacities favored to be related to edema rather than pneumonia.  There are probable small effusions, certainly a significant collection  of pleural fluid is not demonstrated. Normal heart size.             Impression:       Some improvement in perihilar and basilar predominant  opacities favored to be related to edema rather than pneumonia.                XR Chest 1 View [[id number]] Collected:  11/19/18 1717     Updated:  11/19/18 1722    Narrative:       XR CHEST 1 VW-     HISTORY: Female who is 42 years-old,  aspiration     TECHNIQUE: Frontal view of the chest     COMPARISON: None available     FINDINGS:. Size is normal. Aorta appears tortuous. Scattered infiltrates  are seen throughout the right lung, also on the left, predominantly at  the mid to lower left lung, and may relate to stated history of  aspiration, other possible causes could include underlying pneumonia  and/or edema,  clinical correlation and follow-up recommended. No large  pleural effusion, no pneumothorax. No acute osseous process.       Impression:       Right more than left airspace opacities, follow-up  recommended.     This report was finalized on 11/19/2018 5:19 PM by Dr. Sterling Herrmann M.D.             I reviewed the patient's new clinical results.  I personally viewed and interpreted the patient's imaging results: The rapid improvement in the bilateral pulmonary infiltrate over 12 hour period is strongly suggestive of hydrostatic edema rather than infectious process, the infiltrate did not completely resolve however        Medication Review:     famotidine 20 mg Oral BID   naproxen 500 mg Oral Q8H            ASSESSMENT:   1. Acute postoperative hypoxemic respiratory failure secondary to #2  2. Acute negative pressure pulmonary edema with preserved EF on the echocardiogram, awaiting final report  3. Status post hysterectomy for menorrhagia  4. Suspected sleep apnea    PLAN:  Bronchospasm has resolved with no wheezes on exam today, she did respond very well to the diuretics and her chest x-ray showing significant improvement.  No antibiotic is being given at this point since this is more of a hydrostatic process rather than aspiration pneumonitis however this can be considered in case of any new problem like fever or purulent secretions, which she is coughing right now is still clear  Patient is currently on room air as of this morning and clinically feeling better  We will do walking oximetry on room air to check on her overall respiratory status and she should be cleared for discharge later today if she continues to improve.  Patient will be provided with contact information to call me in case she had any worsening respiratory issues.  Will follow on the final echocardiogram report  Discussed with Dr. Martin, will update her with the patient status later this afternoon and the clearance for discharge home from the  pulmonary standpoint.        Disposition: Hopefully home later today or in a.m.    Niki Sims MD  11/20/18  10:33 AM          Dictated utilizing Dragon dictation

## 2018-11-20 NOTE — PLAN OF CARE
Problem: Patient Care Overview  Goal: Plan of Care Review  Outcome: Ongoing (interventions implemented as appropriate)   11/20/18 0710   Coping/Psychosocial   Plan of Care Reviewed With patient;significant other   Plan of Care Review   Progress no change   OTHER   Outcome Summary VSS. Pt alert and oriented. C/o pain and nausea throughout the night. oxygen down to 3L. FC removed this am. Will CTM     Goal: Discharge Needs Assessment  Outcome: Ongoing (interventions implemented as appropriate)      Problem: Pain, Acute (Adult)  Goal: Acceptable Pain Control/Comfort Level  Outcome: Ongoing (interventions implemented as appropriate)

## 2018-11-20 NOTE — PROGRESS NOTES
[facility]  POST OP  PROGRESS NOTE    Patient Name: Jaskaran Mckay  :  1975  MRN:  [id number]      POD1 [facility]  Subjective     Patient reports:    Pain is well controlled. Denies nausea and vomiting. Tolerating po. Santana out but not yet voided or ambulated.    No chest pain. No sob. Still coughing up a little phlegm but not a lot.      Objective       Vitals: Vital Signs Range for the last 24 hours  Temperature: Temp:  [97.3 °F (36.3 °C)-98.4 °F (36.9 °C)] 97.3 °F (36.3 °C)       BP: BP: (119-166)/(68-99) 119/68   Pulse: Heart Rate:  [] 99   Respirations: Resp:  [16-18] 16         Intake/Output Summary (Last 24 hours) at 2018 1019  Last data filed at 2018 0602  Gross per 24 hour   Intake 3150 ml   Output 3500 ml   Net -350 ml                             Sitting in bed- on RA now- 92-93% O2 sats                Physical Exam     General  Alert in NAD  RRR  Mild crackles but much better than yesterday in pacu      Abdomen Soft, non-distended, appropriately tender    Incision  Clean, dry and intact     Extremities Calves NT bilaterally          Lab results reviewed:  CBC:   Lab Results   Component Value Date    WBC 13.98 (H) 2018    HGB 11.3 (L) 2018    HCT 34.6 (L) 2018          Assessment/Plan     POD 1 - Doing well. Hemodynamically stable. Intraoperative events and findings reviewed with the patient. D/w converted H to [facility] trying to prevent complications.    Pulmonary- had low O2 sats in pacu. With acute pulmonary edema prob from extubation events. Much better today. Pulmonary consulting. O2 sats better today. Prelim nl cardiac echo this am. Will d/c home once pulmonary gives ok from resp standpoint.      Plan:   Change to po pain meds. Make sure can void. Ambulate today. prob home later today or in am pending resp status.         Menorrhagia                   Mirtha Martin MD  2018  10:19 AM

## 2018-11-20 NOTE — PROGRESS NOTES
PM note    Couldn't wean off O2 today. [place] sob when up walking but felt better to walk.  Coughing up some stuff. No fever/chills.    Pain meds working. Tolerating po well.    AF/VSS    O2 sats drop to 86-91% on RA. On nasal canula 1L up to 6-98%.    NAD  RRR  Few basilar crackles but moving air good    Will need to stay at least until tomorrow as needs to maintain O2 on RA.  D/w prob reaction to anesthesia with flash pulmonary edema. Nl heart echo.

## 2018-11-20 NOTE — PLAN OF CARE
Problem: Patient Care Overview  Goal: Plan of Care Review  Outcome: Ongoing (interventions implemented as appropriate)   11/19/18 1905   Coping/Psychosocial   Plan of Care Reviewed With patient   Plan of Care Review   Progress no change   OTHER   Outcome Summary new admission from the or. medicated for pain/nausea. family at the bedside.        Problem: Pain, Acute (Adult)  Goal: Identify Related Risk Factors and Signs and Symptoms  Outcome: Ongoing (interventions implemented as appropriate)    Goal: Acceptable Pain Control/Comfort Level  Outcome: Ongoing (interventions implemented as appropriate)

## 2018-11-21 VITALS
BODY MASS INDEX: 41.46 KG/M2 | OXYGEN SATURATION: 95 % | SYSTOLIC BLOOD PRESSURE: 122 MMHG | TEMPERATURE: 98.5 F | HEART RATE: 99 BPM | WEIGHT: 234 LBS | RESPIRATION RATE: 16 BRPM | HEIGHT: 63 IN | DIASTOLIC BLOOD PRESSURE: 76 MMHG

## 2018-11-21 PROCEDURE — 94799 UNLISTED PULMONARY SVC/PX: CPT

## 2018-11-21 PROCEDURE — 94618 PULMONARY STRESS TESTING: CPT

## 2018-11-21 RX ADMIN — FAMOTIDINE 20 MG: 20 TABLET, FILM COATED ORAL at 08:46

## 2018-11-21 RX ADMIN — NAPROXEN 500 MG: 500 TABLET ORAL at 06:55

## 2018-11-21 RX ADMIN — OXYCODONE AND ACETAMINOPHEN 1 TABLET: 5; 325 TABLET ORAL at 08:46

## 2018-11-21 NOTE — PLAN OF CARE
Problem: Patient Care Overview  Goal: Plan of Care Review  Outcome: Ongoing (interventions implemented as appropriate)   11/21/18 0550   Coping/Psychosocial   Plan of Care Reviewed With patient   Plan of Care Review   Progress improving   OTHER   Outcome Summary VSS. pt alert and oriented. 1L throughout the night now on RA.  c/o pain x1. no new complaints overnight. up to BR. Will CTM     Goal: Discharge Needs Assessment  Outcome: Ongoing (interventions implemented as appropriate)

## 2018-11-21 NOTE — DISCHARGE SUMMARY
[facility]  POST OP DISCHARGE SUMMARY  Patient Name: Jaskaran Mckay  :  1975  MRN:  [id number]      POD 2 [facility]    Patient reports:    Pain is well controlled. Denies nausea and vomiting. Tolerating po.   Voiding and ambulating without difficulty. Ready to go home    Post op pulmonary edema. Stayed extra day for O2 sats to improve. Nl cardiac echo. Pulmonary consulting. Feels better. No sob. Not coughing like before.       Objective       Vitals: Vital Signs Range for the last 24 hours  Temperature: Temp:  [98 °F (36.7 °C)-98.5 °F (36.9 °C)] 98.5 °F (36.9 °C)       BP: BP: (117-142)/(62-93) 122/76   Pulse: Heart Rate:  [] 92   Respirations: Resp:  [16-18] 16       Weaned off O2 few hrs ago and O2 on RA >95%                                              Physical Exam     General  Alert in NAD  RRR  CTAB-     Abdomen Soft, non-distended, appropriately tender    Incision  Clean, dry and intact     Extremities Calves NT bilaterally          Lab results reviewed:  CBC:   Lab Results   Component Value Date    WBC 13.98 (H) 2018    HGB 11.3 (L) 2018    HCT 34.6 (L) 2018          Cardiac echo- normal    Assessment/Plan     POD 2 - Doing well. Hemodynamically stable. Ready to go home. D/c d/w. F/u office 1 wk.     Post op pulmonary edema with low O2 sats --- much better. Pulmonary consulting. She had been told with recent hospitalization that needed pulmonary eval for sleep apnea.     Plan:  Stable for discharge from gyn post op standpoint. Will d/c home as long as ok with pulmonary. Post op instruction discussed. Follow up in1 week.          Menorrhagia                   Mirtha Martin MD  2018  8:03 AM

## 2018-11-21 NOTE — THERAPY EVALUATION
Exercise Oximetry    Patient Name:Jaskaran Mckay   MRN: [id number]   Date: 11/21/18             ROOM AIR BASELINE   SpO2%        91   Heart Rate    Blood Pressure      EXERCISE ON ROOM AIR SpO2% EXERCISE ON O2 @ LPM SpO2%   1 MINUTE       91 1 MINUTE    2 MINUTES       91 2 MINUTES    3 MINUTES       92 3 MINUTES    4 MINUTES       91 4 MINUTES    5 MINUTES  5 MINUTES    6 MINUTES  6 MINUTES               Distance Walked   Distance Walked   Dyspnea (Arnulfo Scale)   Dyspnea (Arnulfo Scale)   Fatigue (Arnulfo Scale)   Fatigue (Arnulfo Scale)   SpO2% Post Exercise   SpO2% Post Exercise   HR Post Exercise   HR Post Exercise   Time to Recovery   Time to Recovery     Comments:     Patient very much in pain, drowsy, and took a lot for her get out of the bed. She had to stop and go and rest quite a few times. We only walked from her door way down the rodriguez to the unit secretary side of the unit and back. She didn't de sat but could only perform walking for 4 minutes due to weakness and unable to go further. I reported to the nurse that she was very weak and in pain and was concerned about going home.     Thank you   gomez brooke RRT

## 2018-11-21 NOTE — PROGRESS NOTES
PROGRESS NOTE  Patient Name: Jaskaran Mckay  Age/Sex: 42 y.o. female  : 1975  MRN: [id number]    Date of Admission: 2018  Date of Encounter Visit: 18   LOS: 0 days   Patient Care Team:  Tanner Boyle MD as PCP - General (Internal Medicine)  Mirtha Martin MD as Consulting Physician (Obstetrics and Gynecology)  Robert York MD as Consulting Physician (Hematology and Oncology)  Mirtha Martin MD as Referring Physician (Obstetrics and Gynecology)    Chief Complaint: No more significant dyspnea with minimal persistent cough    Hospital course: Postoperative complication with hypoxemia, admitted for observation and further postoperative surgical care    Interval History: Patient with no previous history of congestive heart failure or pulmonary edema or recurrent pneumonias.  Had a postop hypoxemia after she regurgitated some secretions but she was awake at the time and was able to cough it out.  She was requiring too much oxygen, chest x-ray was done after the pulmonary consultation that showed what looks like pulmonary edema versus aspiration pneumonitis and she was given diuretics and was clinically improving and was able to come off the oxygen the next morning.  No history of heart failure before with echocardiogram showing preserved EF . exercise oximetry on RA was negative for hypoxemia, she was noted to desaturate while asleep mainly after she took her pain medications, the pattern is suggestive of CHARANJIT.     REVIEW OF SYSTEMS:   CONSTITUTIONAL: no fever or chills  CARDIOVASCULAR: No chest pain, chest pressure or chest discomfort. No palpitations or edema.   RESPIRATORY: Improved shortness of breath with minimal residual cough with clear secretions   GASTROINTESTINAL: Abdominal pain after hysterectomy but she is able to tolerate by mouth.   HEMATOLOGIC: No bleeding or bruising.     Ventilator/Non-Invasive Ventilation Settings (From admission, onward)    None            Vital Signs  Temp:   "[98 °F (36.7 °C)-98.5 °F (36.9 °C)] 98.5 °F (36.9 °C)  Heart Rate:  [] 99  Resp:  [16-18] 16  BP: (117-142)/(62-93) 122/76  SpO2:  [86 %-99 %] 95 %  on  Flow (L/min):  [1] 1 Device (Oxygen Therapy): room air  No intake or output data in the 24 hours ending 11/21/18 1132  Flowsheet Rows      First Filed Value   Admission Height  160 cm (63\") Documented at 11/19/2018 0657   Admission Weight  106 kg (234 lb 9 oz) Documented at 11/19/2018 0657        Body mass index is 41.45 kg/m².      11/19/18  0657 11/20/18  0500 11/20/18  0724   Weight: 106 kg (234 lb 9 oz) 106 kg (233 lb 11 oz) 106 kg (234 lb)       Physical Exam:  GEN:  No acute distress, alert, cooperative, well developed   EYES:   Sclera clear. No icterus. PERRL. Normal EOM  ENT:   External ears/nose normal, no oral lesions, no thrush, mucous membranes moist  NECK:  Supple, midline trachea, no JVD  LUNGS: Normal chest on inspection, no more wheezes, nearly resolved crackles over the right base.   CV:  Regular rhythm and rate. Normal S1/S2. No murmurs, gallops, or rubs noted.  ABD:  Soft, minimal tenderness and non-distended. Normal bowel sounds. No guarding  EXT:  Moves all extremities well. No cyanosis. No redness. No edema.   Skin: dry, intact, no bleeding    Results Review:      Results from last 7 days   Lab Units  11/20/18   0555  11/19/18 2015 11/19/18   1745   SODIUM mmol/L  135*  134*  133*   POTASSIUM mmol/L  4.2  4.0  3.9   CHLORIDE mmol/L  96*  95*  96*   CO2 mmol/L  25.0  24.5  20.9*   BUN mg/dL  8  7  7   CREATININE mg/dL  0.87  0.84  0.90   CALCIUM mg/dL  8.8  8.5*  8.6   AST (SGOT) U/L   --    --   23   ALT (SGPT) U/L   --    --   26   ANION GAP mmol/L  14.0  14.5  16.1   ALBUMIN g/dL   --    --   3.80             Results from last 7 days   Lab Units  11/19/18   1745   PROBNP pg/mL  52.2     Results from last 7 days   Lab Units  11/20/18   0555  11/19/18   1745   WBC 10*3/mm3  13.98*  13.45*   HEMOGLOBIN g/dL  11.3*  12.7   HEMATOCRIT %  " 34.6*  39.0   PLATELETS 10*3/mm3  291  304   MCV fL  94.3  94.4   NEUTROPHIL % %   --   94.1*   LYMPHOCYTE % %   --   4.8*   MONOCYTES % %   --   1.0*   EOSINOPHIL % %   --   0.0*   BASOPHIL % %   --   0.1   IMM GRAN % %   --   0.2                   Invalid input(s): LDLCALC          No results found for: POCGLU                        Echo 11/20/2018:  · Left ventricular systolic function is normal. Calculated EF = 64%. Estimated EF was in agreement with the calculated EF. Estimated EF = 64%. Normal left ventricular cavity size noted. All left ventricular wall segments contract normally. Left ventricular wall thickness is consistent with moderate concentric hypertrophy. Left ventricular diastolic function is normal.  · The interatrial septum appears redundant. Saline test results are negative.  · Trace mitral valve regurgitation is present.  Imaging:   Imaging Results (all)     Procedure Component Value Units Date/Time    XR Chest 1 View [[id number]] Collected:  11/20/18 0640     Updated:  11/20/18 0641    Narrative:       PORTABLE CHEST X-RAY     CLINICAL HISTORY: resp failure; N92.0-Excessive and frequent  menstruation with regular cycle     COMPARISON: 11/19/2018.     FINDINGS: Portable AP view of the chest was obtained with overlying  monitor leads in place. Lungs are fairly well inflated. There has been  fairly significant improvement in perihilar and basilar predominant  airspace opacities favored to be related to edema rather than pneumonia.  There are probable small effusions, certainly a significant collection  of pleural fluid is not demonstrated. Normal heart size.             Impression:       Some improvement in perihilar and basilar predominant  opacities favored to be related to edema rather than pneumonia.                XR Chest 1 View [[id number]] Collected:  11/19/18 1717     Updated:  11/19/18 1722    Narrative:       XR CHEST 1 VW-     HISTORY: Female who is 42 years-old,  aspiration      TECHNIQUE: Frontal view of the chest     COMPARISON: None available     FINDINGS:. Size is normal. Aorta appears tortuous. Scattered infiltrates  are seen throughout the right lung, also on the left, predominantly at  the mid to lower left lung, and may relate to stated history of  aspiration, other possible causes could include underlying pneumonia  and/or edema, clinical correlation and follow-up recommended. No large  pleural effusion, no pneumothorax. No acute osseous process.       Impression:       Right more than left airspace opacities, follow-up  recommended.     This report was finalized on 11/19/2018 5:19 PM by Dr. Sterling Herrmann M.D.             I reviewed the patient's new clinical results.  I personally viewed and interpreted the patient's imaging results: The rapid improvement in the bilateral pulmonary infiltrate over 12 hour period is strongly suggestive of hydrostatic edema rather than infectious process, the infiltrate did not completely resolve however        Medication Review:     famotidine 20 mg Oral BID   naproxen 500 mg Oral Q8H            ASSESSMENT:   1. Acute postoperative hypoxemic respiratory failure secondary to #2  2. Acute negative pressure pulmonary edema with preserved EF on the echocardiogram, awaiting final report  3. Status post hysterectomy for menorrhagia  4. Suspected sleep apnea    PLAN:  Patient is doing better, on RA, good exercise oximetry  She does desaturate at night but was able to do ok without the O2  She needs to follow up in 1 week with repeat CXR and for evaluation for CHARANJIT with a home sleep study.  Advised to minimize the use of the narcotics  I did explain to the patient the fact that no O2 is needed on discharge and will assess for the need for the CPAP in 1 week after her lungs recover from the recent edema.            Disposition:  home   today  .    Niki Sims MD  11/21/18  11:32 AM          Dictated utilizing Dragon dictation

## 2019-07-22 NOTE — H&P
History and physical reviewed/updated and changes noted.    Started menses today. Light so far.    D/w PAT hb with sig anemia. Hb 7 range is often where she drops to and why needs surgery.  
Ambulatory

## 2020-04-28 ENCOUNTER — APPOINTMENT (OUTPATIENT)
Dept: WOMENS IMAGING | Facility: HOSPITAL | Age: 45
End: 2020-04-28

## 2020-04-28 PROCEDURE — 77062 BREAST TOMOSYNTHESIS BI: CPT | Performed by: RADIOLOGY

## 2020-04-28 PROCEDURE — 77066 DX MAMMO INCL CAD BI: CPT | Performed by: RADIOLOGY

## 2020-04-28 PROCEDURE — 76641 ULTRASOUND BREAST COMPLETE: CPT | Performed by: RADIOLOGY

## 2020-04-28 PROCEDURE — G0279 TOMOSYNTHESIS, MAMMO: HCPCS | Performed by: RADIOLOGY

## 2022-07-26 ENCOUNTER — APPOINTMENT (OUTPATIENT)
Dept: WOMENS IMAGING | Facility: HOSPITAL | Age: 47
End: 2022-07-26

## 2022-07-26 PROCEDURE — 77063 BREAST TOMOSYNTHESIS BI: CPT | Performed by: RADIOLOGY

## 2022-07-26 PROCEDURE — 77067 SCR MAMMO BI INCL CAD: CPT | Performed by: RADIOLOGY

## 2024-04-09 NOTE — ANESTHESIA POSTPROCEDURE EVALUATION
"Patient: Jaskaran Mckay    Procedure Summary     Date:  11/19/18 Room / Location:  [facility] OR  / [facility] MAIN OR    Anesthesia Start:  0757 Anesthesia Stop:  1408    Procedure:  TOTAL LAPAROSCOPIC HYSTERECTOMY BILATERAL SALPINGECTOMY WITH DAVINCI ROBOT (N/A Abdomen) Diagnosis:      Surgeon:  Mirtha Martin MD Provider:  Mina Greer MD    Anesthesia Type:  general ASA Status:  3          Anesthesia Type: general  Last vitals  BP   156/99 (11/19/18 1900)   Temp   36.7 °C (98 °F) (11/19/18 1825)   Pulse   104 (11/19/18 1900)   Resp   16 (11/19/18 1900)     SpO2   95 % (11/19/18 1745)     Post Anesthesia Care and Evaluation    Patient location during evaluation: bedside  Patient participation: complete - patient participated  Level of consciousness: awake and alert  Pain management: adequate  Airway patency: patent  Anesthetic complications: No anesthetic complications    Cardiovascular status: acceptable  Respiratory status: acceptable  Hydration status: acceptable    Comments: /99 (BP Location: Right arm, Patient Position: Lying)   Pulse 104   Temp 36.7 °C (98 °F)   Resp 16   Ht 160 cm (63\")   Wt 106 kg (234 lb 9 oz)   [facility] 11/02/2018   SpO2 95%   BMI 41.55 kg/m²       " yes

## (undated) DEVICE — ENDOPATH XCEL BLADELESS TROCARS WITH STABILITY SLEEVES: Brand: ENDOPATH XCEL

## (undated) DEVICE — GLV SURG BIOGEL LTX PF 7 1/2

## (undated) DEVICE — SUT MNCRYL PLS ANTIB UD 4/0 PS2 18IN

## (undated) DEVICE — ADHS SKIN DERMABOND TOP ADVANCED

## (undated) DEVICE — TIP COVER ACCESSORY

## (undated) DEVICE — CATH FOL SIMPLYLATEX SILELAST 16F 17IN

## (undated) DEVICE — NDL SPINE 22G 31/2IN BLK

## (undated) DEVICE — UNDYED BRAIDED (POLYGLACTIN 910), SYNTHETIC ABSORBABLE SUTURE: Brand: COATED VICRYL

## (undated) DEVICE — PAD SANI MAXI W/ADHS SNG WRP 11IN

## (undated) DEVICE — GLV SURG TRIUMPH CLASSIC PF LTX 7 STRL

## (undated) DEVICE — Device

## (undated) DEVICE — SYR CONTRL LUERLOK 10CC

## (undated) DEVICE — MANIP UTER RUMI TP 6.7MM 10CM GRN

## (undated) DEVICE — SOL ANTISTICK CAUTRY ELECTROLUBE LF

## (undated) DEVICE — LOU LITHOTOMY ROBOTIC: Brand: MEDLINE INDUSTRIES, INC.

## (undated) DEVICE — OBT BLADLES ENDOWRIST DAVINCI/S 8MM

## (undated) DEVICE — LOU D & C HYSTEROSCOPY: Brand: MEDLINE INDUSTRIES, INC.

## (undated) DEVICE — MEDICINE CUP, GRADUATED, STER: Brand: MEDLINE

## (undated) DEVICE — SOL NACL 0.9PCT 1000ML

## (undated) DEVICE — MANIP UTER RUMI 2 KOH EFFICIENT 3.5CM BL

## (undated) DEVICE — PROB ABL ENDOMTRL NOVASURE/G4 W/SURESND

## (undated) DEVICE — DRAPE,REIN 53X77,STERILE: Brand: MEDLINE

## (undated) DEVICE — KIT INCLUDES: GTB14, ALEXIS CONTAINED EXT SYS 5BXCNGL2, GELPOINT ADVANCED ACCESS PLATFORM: Brand: ALEXIS CONTAINED EXTRACTION SYSTEM WITH GELPOINT ADVANCED ACCESS PLATFORM

## (undated) DEVICE — STRAP STIRUP SLP RNG 19X3.5IN DISP

## (undated) DEVICE — ST IRR CYSTO W/SPK 77IN LF